# Patient Record
Sex: FEMALE | Race: WHITE | HISPANIC OR LATINO | Employment: PART TIME | URBAN - METROPOLITAN AREA
[De-identification: names, ages, dates, MRNs, and addresses within clinical notes are randomized per-mention and may not be internally consistent; named-entity substitution may affect disease eponyms.]

---

## 2023-10-16 ENCOUNTER — APPOINTMENT (OUTPATIENT)
Dept: GENERAL RADIOLOGY | Facility: HOSPITAL | Age: 33
End: 2023-10-16
Payer: OTHER GOVERNMENT

## 2023-10-16 ENCOUNTER — HOSPITAL ENCOUNTER (EMERGENCY)
Facility: HOSPITAL | Age: 33
Discharge: HOME | End: 2023-10-16
Attending: EMERGENCY MEDICINE | Admitting: EMERGENCY MEDICINE
Payer: OTHER GOVERNMENT

## 2023-10-16 VITALS
TEMPERATURE: 98.4 F | SYSTOLIC BLOOD PRESSURE: 112 MMHG | WEIGHT: 172.4 LBS | BODY MASS INDEX: 31.73 KG/M2 | DIASTOLIC BLOOD PRESSURE: 69 MMHG | HEART RATE: 72 BPM | HEIGHT: 62 IN | OXYGEN SATURATION: 99 % | RESPIRATION RATE: 15 BRPM

## 2023-10-16 DIAGNOSIS — R50.9 FEVER, UNSPECIFIED FEVER CAUSE: Primary | ICD-10-CM

## 2023-10-16 LAB
ALBUMIN SERPL-MCNC: 4.3 G/DL (ref 3.5–5.2)
ALBUMIN/GLOB SERPL: 1.3 G/DL
ALP SERPL-CCNC: 76 U/L (ref 39–117)
ALT SERPL W P-5'-P-CCNC: 16 U/L (ref 1–33)
ANION GAP SERPL CALCULATED.3IONS-SCNC: 9 MMOL/L (ref 5–15)
AST SERPL-CCNC: 18 U/L (ref 1–32)
BACTERIA UR QL AUTO: ABNORMAL /HPF
BASOPHILS # BLD AUTO: 0.1 10*3/MM3 (ref 0–0.2)
BASOPHILS NFR BLD AUTO: 0.9 % (ref 0–1.5)
BILIRUB SERPL-MCNC: 0.2 MG/DL (ref 0–1.2)
BILIRUB UR QL STRIP: NEGATIVE
BUN SERPL-MCNC: 9 MG/DL (ref 6–20)
BUN/CREAT SERPL: 14.1 (ref 7–25)
CALCIUM SPEC-SCNC: 9.3 MG/DL (ref 8.6–10.5)
CHLORIDE SERPL-SCNC: 101 MMOL/L (ref 98–107)
CK SERPL-CCNC: 93 U/L (ref 20–180)
CLARITY UR: CLEAR
CO2 SERPL-SCNC: 28 MMOL/L (ref 22–29)
COLOR UR: YELLOW
CREAT SERPL-MCNC: 0.64 MG/DL (ref 0.57–1)
D-LACTATE SERPL-SCNC: 0.6 MMOL/L (ref 0.3–2)
DEPRECATED RDW RBC AUTO: 41.1 FL (ref 37–54)
EGFRCR SERPLBLD CKD-EPI 2021: 121.3 ML/MIN/1.73
EOSINOPHIL # BLD AUTO: 0.1 10*3/MM3 (ref 0–0.4)
EOSINOPHIL NFR BLD AUTO: 1.3 % (ref 0.3–6.2)
ERYTHROCYTE [DISTWIDTH] IN BLOOD BY AUTOMATED COUNT: 12.5 % (ref 12.3–15.4)
ERYTHROCYTE [SEDIMENTATION RATE] IN BLOOD: 35 MM/HR (ref 0–20)
GLOBULIN UR ELPH-MCNC: 3.3 GM/DL
GLUCOSE SERPL-MCNC: 90 MG/DL (ref 65–99)
GLUCOSE UR STRIP-MCNC: NEGATIVE MG/DL
HCT VFR BLD AUTO: 39.1 % (ref 34–46.6)
HGB BLD-MCNC: 13 G/DL (ref 12–15.9)
HGB UR QL STRIP.AUTO: ABNORMAL
HYALINE CASTS UR QL AUTO: ABNORMAL /LPF
KETONES UR QL STRIP: NEGATIVE
LEUKOCYTE ESTERASE UR QL STRIP.AUTO: ABNORMAL
LYMPHOCYTES # BLD AUTO: 3.3 10*3/MM3 (ref 0.7–3.1)
LYMPHOCYTES NFR BLD AUTO: 31.6 % (ref 19.6–45.3)
MCH RBC QN AUTO: 29.7 PG (ref 26.6–33)
MCHC RBC AUTO-ENTMCNC: 33.3 G/DL (ref 31.5–35.7)
MCV RBC AUTO: 89.4 FL (ref 79–97)
MONOCYTES # BLD AUTO: 0.9 10*3/MM3 (ref 0.1–0.9)
MONOCYTES NFR BLD AUTO: 8.9 % (ref 5–12)
NEUTROPHILS NFR BLD AUTO: 57.3 % (ref 42.7–76)
NEUTROPHILS NFR BLD AUTO: 6.1 10*3/MM3 (ref 1.7–7)
NITRITE UR QL STRIP: NEGATIVE
NRBC BLD AUTO-RTO: 0.1 /100 WBC (ref 0–0.2)
PH UR STRIP.AUTO: 7 [PH] (ref 5–8)
PLATELET # BLD AUTO: 271 10*3/MM3 (ref 140–450)
PMV BLD AUTO: 8.2 FL (ref 6–12)
POTASSIUM SERPL-SCNC: 3.7 MMOL/L (ref 3.5–5.2)
PROT SERPL-MCNC: 7.6 G/DL (ref 6–8.5)
PROT UR QL STRIP: NEGATIVE
RBC # BLD AUTO: 4.37 10*6/MM3 (ref 3.77–5.28)
RBC # UR STRIP: ABNORMAL /HPF
REF LAB TEST METHOD: ABNORMAL
SARS-COV-2 RNA RESP QL NAA+PROBE: NOT DETECTED
SODIUM SERPL-SCNC: 138 MMOL/L (ref 136–145)
SP GR UR STRIP: 1.01 (ref 1–1.03)
SQUAMOUS #/AREA URNS HPF: ABNORMAL /HPF
TSH SERPL DL<=0.05 MIU/L-ACNC: 1.8 UIU/ML (ref 0.27–4.2)
UROBILINOGEN UR QL STRIP: ABNORMAL
WBC # UR STRIP: ABNORMAL /HPF
WBC NRBC COR # BLD: 10.6 10*3/MM3 (ref 3.4–10.8)

## 2023-10-16 PROCEDURE — 81001 URINALYSIS AUTO W/SCOPE: CPT | Performed by: EMERGENCY MEDICINE

## 2023-10-16 PROCEDURE — 86038 ANTINUCLEAR ANTIBODIES: CPT | Performed by: EMERGENCY MEDICINE

## 2023-10-16 PROCEDURE — 86235 NUCLEAR ANTIGEN ANTIBODY: CPT | Performed by: EMERGENCY MEDICINE

## 2023-10-16 PROCEDURE — 86618 LYME DISEASE ANTIBODY: CPT | Performed by: EMERGENCY MEDICINE

## 2023-10-16 PROCEDURE — 36415 COLL VENOUS BLD VENIPUNCTURE: CPT

## 2023-10-16 PROCEDURE — 87798 DETECT AGENT NOS DNA AMP: CPT | Performed by: EMERGENCY MEDICINE

## 2023-10-16 PROCEDURE — 80053 COMPREHEN METABOLIC PANEL: CPT | Performed by: EMERGENCY MEDICINE

## 2023-10-16 PROCEDURE — 86431 RHEUMATOID FACTOR QUANT: CPT | Performed by: EMERGENCY MEDICINE

## 2023-10-16 PROCEDURE — 86225 DNA ANTIBODY NATIVE: CPT | Performed by: EMERGENCY MEDICINE

## 2023-10-16 PROCEDURE — 87484 EHRLICHA CHAFFEENSIS AMP PRB: CPT | Performed by: EMERGENCY MEDICINE

## 2023-10-16 PROCEDURE — 99283 EMERGENCY DEPT VISIT LOW MDM: CPT

## 2023-10-16 PROCEDURE — 84443 ASSAY THYROID STIM HORMONE: CPT | Performed by: EMERGENCY MEDICINE

## 2023-10-16 PROCEDURE — 82550 ASSAY OF CK (CPK): CPT | Performed by: EMERGENCY MEDICINE

## 2023-10-16 PROCEDURE — 83516 IMMUNOASSAY NONANTIBODY: CPT | Performed by: EMERGENCY MEDICINE

## 2023-10-16 PROCEDURE — 85025 COMPLETE CBC W/AUTO DIFF WBC: CPT | Performed by: EMERGENCY MEDICINE

## 2023-10-16 PROCEDURE — 87635 SARS-COV-2 COVID-19 AMP PRB: CPT | Performed by: EMERGENCY MEDICINE

## 2023-10-16 PROCEDURE — 83605 ASSAY OF LACTIC ACID: CPT

## 2023-10-16 PROCEDURE — 85652 RBC SED RATE AUTOMATED: CPT | Performed by: EMERGENCY MEDICINE

## 2023-10-16 PROCEDURE — 71046 X-RAY EXAM CHEST 2 VIEWS: CPT

## 2023-10-16 PROCEDURE — 87040 BLOOD CULTURE FOR BACTERIA: CPT | Performed by: EMERGENCY MEDICINE

## 2023-10-16 PROCEDURE — 87468 ANAPLSMA PHGCYTOPHLM AMP PRB: CPT | Performed by: EMERGENCY MEDICINE

## 2023-10-17 LAB — CHROMATIN AB SERPL-ACNC: <10 IU/ML (ref 0–14)

## 2023-10-17 NOTE — DISCHARGE INSTRUCTIONS
Return for increasing fever vomiting red hot swollen joints rashes shortness of breath or any other new or worse problems or concerns return me to the ER.  Follow-up as directed above the follow-up cultures and tick panel as well as BHUMIKA's and rheumatoid factors and cultures.

## 2023-10-17 NOTE — ED PROVIDER NOTES
Subjective   History of Present Illness  Chief complaint sent here by the urgent care for recurrent fevers    History of present illness 33-year-old female who complains of a 1 year history of recurrent fevers that will last for few days then go away.  She states the last one was a couple days ago and she still has some residual joint aches and pains in her hands.  No cough congestion she has not had fever and chills no vomiting and diarrhea she denies any rashes no urinary complaints no night sweats.  Denies any weight loss.  Denies any leg pain or swelling no recent tick bites or rashes she did have a history of [place] spotted fever in the past.  She denies any discharge or worry of STD no abdominal pain she is otherwise able to eat drink talk walk and function normally.  Nothing really seems to trigger make it better or worse she really has not had any significant evaluation outside of the emergency room.  She went to an urgent care today and they told her she needed to go to the hospital for further evaluation.  She has no fever today the fever had stopped as of yesterday.  No other ill exposures or foreign travels no recent antibiotic use.  Denies any IV drug use.  No recent procedures or flus viruses or vaccinations.      Review of Systems   Constitutional:  Positive for chills and fever.   Eyes:  Negative for photophobia and visual disturbance.   Respiratory:  Negative for cough, chest tightness and shortness of breath.    Cardiovascular:  Negative for chest pain and palpitations.   Gastrointestinal:  Negative for abdominal pain, blood in stool and vomiting.   Genitourinary:  Negative for difficulty urinating, dysuria and vaginal discharge.   Musculoskeletal:  Positive for arthralgias, myalgias and neck pain.   Skin:  Negative for rash and wound.   Neurological:  Positive for weakness. Negative for dizziness, facial asymmetry and speech difficulty.   Psychiatric/Behavioral:  Negative for confusion.         No past medical history on file.  Past medical history as he has had some [place] spotted fever in the past.  No Known Allergies    No past surgical history on file.    No family history on file.    Social History     Socioeconomic History    Marital status:      Social history no alcohol or drug use.  Prior to Admission medications    Not on File        Objective   Physical Exam  Constitutional this is a 33-year-old female awake alert no acute distress triage vital signs reviewed.  HEENT extraocular muscles are intact pupils equal round react there is no photophobia mouth clear neck supple no adenopathy no meningeal signs.  TMs are clear lungs clear no retraction back no CVA tenderness most cervical thoracic lumbar spine tenderness no redness to the back no signs of infection.  Heart regular without murmur.  Lungs clear no retraction no use of accessories abdomen soft without tenderness good bowel sounds no peritoneal findings no enlarged liver or spleen.  No nodes felt anywhere.  No axillary nodes no supraclavicular nodes no inguinal nodes.  Extremities pulses equal upper and lower extremities no edema cords or Homans' sign no evidence of DVT skin warm dry without rashes or cellulitic changes nothing on the bottom of the feet nothing on the bottom of the hands.  Neurologic awake alert and orientated x4 no Paci symmetry speech normal no focal weakness there is no meningeal signs she walks without difficulty is nontoxic-appearing.  Procedures           ED Course      Results for orders placed or performed during the hospital encounter of 10/16/23   COVID-19,CEPHEID/[place],COR/JANNA/PAD/CASSIE/LAG/MAD IN-HOUSE(OR EMERGENT/ADD-ON),NP SWAB IN TRANSPORT MEDIA 3-4 HR TAT, RT-PCR - Swab, Nasopharynx    Specimen: Nasopharynx; Swab   Result Value Ref Range    COVID19 Not Detected Not Detected - Ref. Range   Comprehensive Metabolic Panel    Specimen: Blood   Result Value Ref Range    Glucose 90 65 - 99 mg/dL    BUN  9 6 - 20 mg/dL    Creatinine 0.64 0.57 - 1.00 mg/dL    Sodium 138 136 - 145 mmol/L    Potassium 3.7 3.5 - 5.2 mmol/L    Chloride 101 98 - 107 mmol/L    CO2 28.0 22.0 - 29.0 mmol/L    Calcium 9.3 8.6 - 10.5 mg/dL    Total Protein 7.6 6.0 - 8.5 g/dL    Albumin 4.3 3.5 - 5.2 g/dL    ALT (SGPT) 16 1 - 33 U/L    AST (SGOT) 18 1 - 32 U/L    Alkaline Phosphatase 76 39 - 117 U/L    Total Bilirubin 0.2 0.0 - 1.2 mg/dL    Globulin 3.3 gm/dL    A/G Ratio 1.3 g/dL    BUN/Creatinine Ratio 14.1 7.0 - 25.0    Anion Gap 9.0 5.0 - 15.0 mmol/L    eGFR 121.3 >60.0 mL/min/1.73   Urinalysis With Microscopic If Indicated (No Culture) - Urine, Clean Catch    Specimen: Urine, Clean Catch   Result Value Ref Range    Color, UA Yellow Yellow, Straw    Appearance, UA Clear Clear    pH, UA 7.0 5.0 - 8.0    Specific Gravity, UA 1.014 1.005 - 1.030    Glucose, UA Negative Negative    Ketones, UA Negative Negative    Bilirubin, UA Negative Negative    Blood, UA Trace (A) Negative    Protein, UA Negative Negative    Leuk Esterase, UA Trace (A) Negative    Nitrite, UA Negative Negative    Urobilinogen, UA 1.0 E.U./dL 0.2 - 1.0 E.U./dL   Sedimentation Rate    Specimen: Blood   Result Value Ref Range    Sed Rate 35 (H) 0 - 20 mm/hr   CK    Specimen: Blood   Result Value Ref Range    Creatine Kinase 93 20 - 180 U/L   TSH    Specimen: Blood   Result Value Ref Range    TSH 1.800 0.270 - 4.200 uIU/mL   CBC Auto Differential    Specimen: Blood   Result Value Ref Range    WBC 10.60 3.40 - 10.80 10*3/mm3    RBC 4.37 3.77 - 5.28 10*6/mm3    Hemoglobin 13.0 12.0 - 15.9 g/dL    Hematocrit 39.1 34.0 - 46.6 %    MCV 89.4 79.0 - 97.0 fL    MCH 29.7 26.6 - 33.0 pg    MCHC 33.3 31.5 - 35.7 g/dL    RDW 12.5 12.3 - 15.4 %    RDW-SD 41.1 37.0 - 54.0 fl    MPV 8.2 6.0 - 12.0 fL    Platelets 271 140 - 450 10*3/mm3    Neutrophil % 57.3 42.7 - 76.0 %    Lymphocyte % 31.6 19.6 - 45.3 %    Monocyte % 8.9 5.0 - 12.0 %    Eosinophil % 1.3 0.3 - 6.2 %    Basophil % 0.9 0.0 -  1.5 %    Neutrophils, Absolute 6.10 1.70 - 7.00 10*3/mm3    Lymphocytes, Absolute 3.30 (H) 0.70 - 3.10 10*3/mm3    Monocytes, Absolute 0.90 0.10 - 0.90 10*3/mm3    Eosinophils, Absolute 0.10 0.00 - 0.40 10*3/mm3    Basophils, Absolute 0.10 0.00 - 0.20 10*3/mm3    nRBC 0.1 0.0 - 0.2 /100 WBC   Urinalysis, Microscopic Only - Urine, Clean Catch    Specimen: Urine, Clean Catch   Result Value Ref Range    RBC, UA 0-2 None Seen, 0-2 /HPF    WBC, UA 3-5 (A) None Seen, 0-2 /HPF    Bacteria, UA None Seen None Seen /HPF    Squamous Epithelial Cells, UA 0-2 None Seen, 0-2 /HPF    Hyaline Casts, UA None Seen None Seen /LPF    Methodology Automated Microscopy    POC Lactate    Specimen: Blood   Result Value Ref Range    Lactate 0.6 0.3 - 2.0 mmol/L     XR Chest 2 View    Result Date: 10/16/2023  No acute cardiopulmonary abnormality. Electronically Signed: Brooks Martin MD  10/16/2023 10:43 PM EDT  Workstation ID: [id number]   Medications - No data to display                                         Medical Decision Making  Medical decision making.  The patient had the above exam evaluation.  Patient had chest x-ray my independent review no pneumonia pneumothorax or acute findings.  Labs obtained my independent review COVID-19 negative comprehensive metabolic profile liver enzymes all unremarkable urine was negative.  Sed rate 35 slightly elevated CK normal TSH normal CBC normal lactate within normal limits.  The patient had cultures pending tick panel was sent for the patient including Lyme disease and that was pending BHUMIKA and rheumatoid factors all sent.  And these are all pending at this time.  The patient gives an recurrent history of recurrent fevers over the last year.  Etiology is not quite clear at this point but she is nontoxic.  I do not see evidence of meningitis or encephalitis I do not see evidence of any cellulitic changes anywhere septic joint no evidence of pneumonia or urinary tract infection or pyelonephritis no  evidence that suggest a stroke no evidence to suggest any acute intra-abdominal process.  Although not a complete list of all possibilities.  We talked about the multiple possibilities including infectious inflammatory unknown origin hematologic oncologic.  Stressed the importance of follow-up and she voiced understanding and she was stable and discharged home for outpatient management and follow-up.  Repeat exam resting company no distress well-appearing otherwise.  No rashes anywhere no petechiae no purpura.    Problems Addressed:  Fever, unspecified fever cause: complicated acute illness or injury    Amount and/or Complexity of Data Reviewed  Labs: ordered. Decision-making details documented in ED Course.  Radiology: ordered and independent interpretation performed. Decision-making details documented in ED Course.        Final diagnoses:   Fever, unspecified fever cause       ED Disposition  ED Disposition       ED Disposition   Discharge    Condition   Stable    Comment   --               PATIENT CONNECTION - [facility] [address]  [phone]  In 1 day      Anupama Lua NP  [address] [facility] [address]  [phone]    In 1 day      Abdullahi Sams MD  [id number] [facility]   [facility] [address] [phone]    In 1 day           Medication List      No changes were made to your prescriptions during this visit.            Jj Beckwith MD  10/17/23 0215

## 2023-10-18 LAB
ANA SER QL: POSITIVE
B BURGDOR IGG SER QL: NEGATIVE

## 2023-10-19 LAB
CENTROMERE B AB SER-ACNC: <0.2 AI (ref 0–0.9)
CHROMATIN AB SERPL-ACNC: <0.2 AI (ref 0–0.9)
DSDNA AB SER-ACNC: 4 IU/ML (ref 0–9)
ENA JO1 AB SER-ACNC: <0.2 AI (ref 0–0.9)
ENA RNP AB SER-ACNC: 0.4 AI (ref 0–0.9)
ENA SCL70 AB SER-ACNC: <0.2 AI (ref 0–0.9)
ENA SM AB SER-ACNC: <0.2 AI (ref 0–0.9)
ENA SM+RNP AB SER-ACNC: <0.2 AI (ref 0–0.9)
ENA SS-A AB SER-ACNC: <0.2 AI (ref 0–0.9)
ENA SS-B AB SER-ACNC: <0.2 AI (ref 0–0.9)
Lab: NORMAL
RIBOSOMAL P AB SER-ACNC: <0.2 AI (ref 0–0.9)

## 2023-10-21 LAB
A PHAGOCYTOPH DNA BLD QL NAA+PROBE: NEGATIVE
BACTERIA SPEC AEROBE CULT: NORMAL
BACTERIA SPEC AEROBE CULT: NORMAL
E CHAFFEENSIS DNA BLD QL NAA+PROBE: NEGATIVE

## 2023-10-23 ENCOUNTER — OFFICE VISIT (OUTPATIENT)
Dept: FAMILY MEDICINE CLINIC | Facility: CLINIC | Age: 33
End: 2023-10-23
Payer: OTHER GOVERNMENT

## 2023-10-23 ENCOUNTER — TELEPHONE (OUTPATIENT)
Dept: FAMILY MEDICINE CLINIC | Facility: CLINIC | Age: 33
End: 2023-10-23

## 2023-10-23 VITALS
BODY MASS INDEX: 31.03 KG/M2 | HEIGHT: 62 IN | HEART RATE: 84 BPM | SYSTOLIC BLOOD PRESSURE: 114 MMHG | OXYGEN SATURATION: 99 % | DIASTOLIC BLOOD PRESSURE: 72 MMHG | RESPIRATION RATE: 16 BRPM | WEIGHT: 168.6 LBS

## 2023-10-23 DIAGNOSIS — R76.8 POSITIVE ANA (ANTINUCLEAR ANTIBODY): ICD-10-CM

## 2023-10-23 DIAGNOSIS — M25.50 ARTHRALGIA, UNSPECIFIED JOINT: Primary | ICD-10-CM

## 2023-10-23 DIAGNOSIS — G43.519 INTRACTABLE PERSISTENT MIGRAINE AURA WITHOUT CEREBRAL INFARCTION AND WITHOUT STATUS MIGRAINOSUS: ICD-10-CM

## 2023-10-23 RX ORDER — CYCLOBENZAPRINE HCL 5 MG
5 TABLET ORAL 3 TIMES DAILY PRN
Qty: 30 TABLET | Refills: 1 | Status: SHIPPED | OUTPATIENT
Start: 2023-10-23

## 2023-10-23 RX ORDER — CELECOXIB 200 MG/1
200 CAPSULE ORAL 2 TIMES DAILY PRN
Qty: 60 CAPSULE | Refills: 1 | Status: SHIPPED | OUTPATIENT
Start: 2023-10-23

## 2023-10-23 RX ORDER — TOPIRAMATE 25 MG/1
25 TABLET ORAL DAILY
Qty: 30 TABLET | Refills: 0 | Status: SHIPPED | OUTPATIENT
Start: 2023-10-23 | End: 2023-10-24

## 2023-10-23 NOTE — TELEPHONE ENCOUNTER
"PATIENT CALLED TO SEE IF DR. XIONG HAD AN EARLIER AVAILABLE APPOINTMENT.    PATIENT STATES THAT SHE HAS HAD A SUDDEN ONSET OF SYMPTOMS:    JOINT AND MUSCLE PAIN TO THE POINT THAT SHE CANNOT AMBULATE PROPERLY - HAVING DIFFICULTY USING HER HANDS, WHICH IS HAVING A SEVERE EFFECT ON HER DAY TO DAY LIFE.    HEADACHE / NECK STIFFNESS    INCONSISTENT FEVER    LEGS FEEL LIKE \"RUBBER BANDS\" AT THE TIME OF CALL, NOTING THAT THIS FEELING USUALLY INDICATES THAT SHE WILL NOT BE ABLE TO WALK DUE TO PAIN LATER IN THE DAY.    PATIENT WAS SEEN AT A [facility] EMERGENCY ROOM ON 10/16/23 AND IS VERY CONCERNED ABOUT SOME OF THE LAB TEST RESULTS THAT HAVE CAME BACK ABNORMAL.    PLEASE ADVISE  [phone]   " Pt educated on discharge. Instructions given. PIV removed.  called to set up uber ride home. Pt wheeled to ed waiting room exit.

## 2023-10-23 NOTE — PROGRESS NOTES
"Chief Complaint  Establish Care    Subjective        Kayleigh Weaver presents to [facility]  History of Present Illness  Est care  Recurrent fevers, joint and muscle pain, fatigue  Kayleigh is a 33-year-old female with no significant past medical history who presents today due to episodes of fever, joint pain and at their abnormal changes since January.  She said that beginning in January she had lost ambulation, and all joints and muscles, she had some disorientation and it went away about in a week or 2.  She said she felt inflamed with physical activity and broke out with a rash with sunburn.  She had this fever up to 102 degrees every 3 weeks.  She usually develops a rash with this.  He has no images of this.  She has mentioned that she last week developed worsening pain in her legs with increased rubber band like feeling in her legs a lot associated with fever and other arthralgias.  She went to the ER and they did lab test on her and had increased inflammation of ESR, positive BHUMIKA negative reflex.  She said she is very fatigued as well.  She has history of brucelosis.     Migraines  She also gets migraines, states that happen after every day.  Has been on Excedrin Migraine for this in the past.  She gets an aura with this and changes in vision.  She has a going to a dark room.        Objective   Vital Signs:  /72 (BP Location: Left arm, Patient Position: Sitting, Cuff Size: Adult)   Pulse 84   Resp 16   Ht 157.5 cm (62\")   Wt 76.5 kg (168 lb 9.6 oz)   SpO2 99%   BMI 30.84 kg/m²   Estimated body mass index is 30.84 kg/m² as calculated from the following:    Height as of this encounter: 157.5 cm (62\").    Weight as of this encounter: 76.5 kg (168 lb 9.6 oz).       BMI is >= 30 and <35. (Class 1 Obesity). The following options were offered after discussion;: exercise counseling/recommendations and nutrition counseling/recommendations      Physical " Exam  Constitutional:       General: She is not in acute distress.     Appearance: Normal appearance. She is normal weight.   HENT:      Head: Normocephalic.      Right Ear: Tympanic membrane and ear canal normal.      Left Ear: Tympanic membrane and ear canal normal.      Nose: Nose normal. No congestion.      Mouth/Throat:      Mouth: Mucous membranes are moist.      Pharynx: No oropharyngeal exudate.   Eyes:      General:         Right eye: No discharge.         Left eye: No discharge.      Extraocular Movements: Extraocular movements intact.      Pupils: Pupils are equal, round, and reactive to light.   Cardiovascular:      Rate and Rhythm: Normal rate and regular rhythm.      Pulses: Normal pulses.      Heart sounds: Normal heart sounds.   Pulmonary:      Effort: Pulmonary effort is normal. No respiratory distress.      Breath sounds: Normal breath sounds. No wheezing.   Abdominal:      General: Abdomen is flat. Bowel sounds are normal. There is no distension.      Palpations: Abdomen is soft.      Tenderness: There is no abdominal tenderness.   Musculoskeletal:         General: Tenderness (Tenderness to palpation throughout the entire back and upper arms.) present. No swelling or deformity. Normal range of motion.      Cervical back: Normal range of motion. No rigidity.   Skin:     General: Skin is warm.      Capillary Refill: Capillary refill takes less than 2 seconds.      Coloration: Skin is not jaundiced.      Findings: No bruising.   Neurological:      General: No focal deficit present.      Mental Status: She is alert and oriented to person, place, and time. Mental status is at baseline.      Cranial Nerves: No cranial nerve deficit.      Coordination: Coordination normal.      Deep Tendon Reflexes: Reflexes normal.   Psychiatric:         Mood and Affect: Mood normal.         Behavior: Behavior normal.        Result Review :  The following data was reviewed by: Lambert Melara MD on 10/23/2023:  Common  labs          10/16/2023    22:12   Common Labs   Glucose 90    BUN 9    Creatinine 0.64    Sodium 138    Potassium 3.7    Chloride 101    Calcium 9.3    Albumin 4.3    Total Bilirubin 0.2    Alkaline Phosphatase 76    AST (SGOT) 18    ALT (SGPT) 16    WBC 10.60    Hemoglobin 13.0    Hematocrit 39.1    Platelets 271      Data reviewed : ER notes             Assessment and Plan   Diagnoses and all orders for this visit:    1. Arthralgia, unspecified joint (Primary)  -     Ambulatory Referral to Rheumatology    2. Positive BHUMIKA (antinuclear antibody)  -     Ambulatory Referral to Rheumatology    Other orders  -     cyclobenzaprine (FLEXERIL) 5 MG tablet; Take 1 tablet by mouth 3 (Three) Times a Day As Needed for Muscle Spasms. Can take 1-2 tablets by mouth three times a day  Dispense: 30 tablet; Refill: 1  -     celecoxib (CeleBREX) 200 MG capsule; Take 1 capsule by mouth 2 (Two) Times a Day As Needed for Mild Pain.  Dispense: 60 capsule; Refill: 1  -     Discontinue: topiramate (Topamax) 25 MG tablet; Take 1 tablet by mouth Daily.  Dispense: 30 tablet; Refill: 0    Kayleigh is a 33-year-old female with no significant past medical history who presents today status post an ER visit where she was noted to have an BHUMIKA positive and ESR elevation.  She does seem very tender to palpation over her entire spine and arms.  No rash notified on exam or other concerning findings on exam.  He does have some blood in urine from previous urine studies, I mentioned possibly getting HIV studies to rule out another cause for these issues, she said she would wait on that.  We discussed further rheumatologic work-up, but will defer to rheumatology at this time.  I believe that this inflammation that she is having could be a rheumatologic issue, but with how tender she is throughout her upper extremities and lower extremities, this does have a feeling of fibromyalgia as well though would not explain the ESR elevation.    We will treat her  for the pain with Celebrex as needed for pain along with Flexeril for muscle pain if that does not improve her pain, can consider duloxetine.      For her migraines, will prescribe Topamax for daily migraines.  If that does not help her, we can consider other new medications         Follow Up   Return in about 6 months (around 4/23/2024) for Annual physical.  Patient was given instructions and counseling regarding her condition or for health maintenance advice. Please see specific information pulled into the AVS if appropriate.

## 2023-10-24 PROBLEM — M25.50 ARTHRALGIA: Status: ACTIVE | Noted: 2023-10-24

## 2023-10-24 PROBLEM — G43.519 INTRACTABLE PERSISTENT MIGRAINE AURA WITHOUT CEREBRAL INFARCTION AND WITHOUT STATUS MIGRAINOSUS: Status: ACTIVE | Noted: 2023-10-24

## 2023-10-24 PROBLEM — R76.8 POSITIVE ANA (ANTINUCLEAR ANTIBODY): Status: ACTIVE | Noted: 2023-10-24

## 2023-10-24 LAB — RICKETTSIA RICKETTSII DNA, RT: NOT DETECTED

## 2023-10-24 RX ORDER — TOPIRAMATE 25 MG/1
25 TABLET ORAL DAILY
Qty: 90 TABLET | Refills: 0 | Status: SHIPPED | OUTPATIENT
Start: 2023-10-24

## 2023-11-17 ENCOUNTER — TELEPHONE (OUTPATIENT)
Dept: FAMILY MEDICINE CLINIC | Facility: CLINIC | Age: 33
End: 2023-11-17
Payer: OTHER GOVERNMENT

## 2023-11-17 NOTE — TELEPHONE ENCOUNTER
Caller: Kayleigh Weaver    Relationship to patient: Self    Best call back number: [phone]    Type of visit: EKG    Requested date:  ANY DAY BUT TUESDAY'S     Additional notes: PATIENT STATES  SHE WAS MESSAGING DR. XIONG AND WAS ADVISED TO CALL AND GET SCHEDULED FOR AN EKG.    PATIENT IS REQUESTING A CALL BACK TO GET SCHEDULED.

## 2023-11-28 ENCOUNTER — HOSPITAL ENCOUNTER (EMERGENCY)
Facility: HOSPITAL | Age: 33
Discharge: HOME | End: 2023-11-28
Attending: EMERGENCY MEDICINE | Admitting: EMERGENCY MEDICINE
Payer: OTHER GOVERNMENT

## 2023-11-28 ENCOUNTER — APPOINTMENT (OUTPATIENT)
Dept: RESPIRATORY THERAPY | Facility: HOSPITAL | Age: 33
End: 2023-11-28
Payer: OTHER GOVERNMENT

## 2023-11-28 ENCOUNTER — APPOINTMENT (OUTPATIENT)
Dept: GENERAL RADIOLOGY | Facility: HOSPITAL | Age: 33
End: 2023-11-28
Payer: OTHER GOVERNMENT

## 2023-11-28 VITALS
OXYGEN SATURATION: 97 % | BODY MASS INDEX: 31.04 KG/M2 | TEMPERATURE: 98.1 F | RESPIRATION RATE: 24 BRPM | WEIGHT: 168.65 LBS | DIASTOLIC BLOOD PRESSURE: 76 MMHG | HEART RATE: 85 BPM | SYSTOLIC BLOOD PRESSURE: 114 MMHG | HEIGHT: 62 IN

## 2023-11-28 DIAGNOSIS — R07.9 CHEST PAIN, UNSPECIFIED TYPE: ICD-10-CM

## 2023-11-28 DIAGNOSIS — R55 NEAR SYNCOPE: ICD-10-CM

## 2023-11-28 DIAGNOSIS — R00.2 PALPITATIONS: Primary | ICD-10-CM

## 2023-11-28 LAB
ALBUMIN SERPL-MCNC: 4.3 G/DL (ref 3.5–5.2)
ALBUMIN/GLOB SERPL: 1.2 G/DL
ALP SERPL-CCNC: 68 U/L (ref 39–117)
ALT SERPL W P-5'-P-CCNC: 15 U/L (ref 1–33)
AMPHET+METHAMPHET UR QL: NEGATIVE
ANION GAP SERPL CALCULATED.3IONS-SCNC: 12 MMOL/L (ref 5–15)
APTT PPP: 27.6 SECONDS (ref 61–76.5)
AST SERPL-CCNC: 17 U/L (ref 1–32)
B-HCG UR QL: NEGATIVE
BACTERIA UR QL AUTO: ABNORMAL /HPF
BARBITURATES UR QL SCN: NEGATIVE
BASOPHILS # BLD AUTO: 0.1 10*3/MM3 (ref 0–0.2)
BASOPHILS NFR BLD AUTO: 0.5 % (ref 0–1.5)
BENZODIAZ UR QL SCN: NEGATIVE
BILIRUB SERPL-MCNC: 0.3 MG/DL (ref 0–1.2)
BILIRUB UR QL STRIP: NEGATIVE
BUN SERPL-MCNC: 10 MG/DL (ref 6–20)
BUN/CREAT SERPL: 15.6 (ref 7–25)
CALCIUM SPEC-SCNC: 9.8 MG/DL (ref 8.6–10.5)
CANNABINOIDS SERPL QL: NEGATIVE
CHLORIDE SERPL-SCNC: 104 MMOL/L (ref 98–107)
CLARITY UR: ABNORMAL
CO2 SERPL-SCNC: 22 MMOL/L (ref 22–29)
COCAINE UR QL: NEGATIVE
COLOR UR: YELLOW
CREAT SERPL-MCNC: 0.64 MG/DL (ref 0.57–1)
D DIMER PPP FEU-MCNC: 0.47 MG/L (FEU) (ref 0–0.5)
DEPRECATED RDW RBC AUTO: 42.4 FL (ref 37–54)
EGFRCR SERPLBLD CKD-EPI 2021: 121.3 ML/MIN/1.73
EOSINOPHIL # BLD AUTO: 0.1 10*3/MM3 (ref 0–0.4)
EOSINOPHIL NFR BLD AUTO: 0.8 % (ref 0.3–6.2)
ERYTHROCYTE [DISTWIDTH] IN BLOOD BY AUTOMATED COUNT: 13 % (ref 12.3–15.4)
ETHANOL UR QL: <0.01 %
GLOBULIN UR ELPH-MCNC: 3.5 GM/DL
GLUCOSE SERPL-MCNC: 154 MG/DL (ref 65–99)
GLUCOSE UR STRIP-MCNC: NEGATIVE MG/DL
HCT VFR BLD AUTO: 39.1 % (ref 34–46.6)
HGB BLD-MCNC: 13.4 G/DL (ref 12–15.9)
HGB UR QL STRIP.AUTO: ABNORMAL
HYALINE CASTS UR QL AUTO: ABNORMAL /LPF
INR PPP: 0.97 (ref 0.93–1.1)
KETONES UR QL STRIP: ABNORMAL
LEUKOCYTE ESTERASE UR QL STRIP.AUTO: ABNORMAL
LYMPHOCYTES # BLD AUTO: 2.9 10*3/MM3 (ref 0.7–3.1)
LYMPHOCYTES NFR BLD AUTO: 25.8 % (ref 19.6–45.3)
MAGNESIUM SERPL-MCNC: 1.9 MG/DL (ref 1.6–2.6)
MCH RBC QN AUTO: 30.4 PG (ref 26.6–33)
MCHC RBC AUTO-ENTMCNC: 34.2 G/DL (ref 31.5–35.7)
MCV RBC AUTO: 88.8 FL (ref 79–97)
METHADONE UR QL SCN: NEGATIVE
MONOCYTES # BLD AUTO: 0.7 10*3/MM3 (ref 0.1–0.9)
MONOCYTES NFR BLD AUTO: 6.7 % (ref 5–12)
NEUTROPHILS NFR BLD AUTO: 66.2 % (ref 42.7–76)
NEUTROPHILS NFR BLD AUTO: 7.4 10*3/MM3 (ref 1.7–7)
NITRITE UR QL STRIP: NEGATIVE
NRBC BLD AUTO-RTO: 0.1 /100 WBC (ref 0–0.2)
NT-PROBNP SERPL-MCNC: <36 PG/ML (ref 0–450)
OPIATES UR QL: NEGATIVE
OXYCODONE UR QL SCN: NEGATIVE
PH UR STRIP.AUTO: 6 [PH] (ref 5–8)
PLATELET # BLD AUTO: 262 10*3/MM3 (ref 140–450)
PMV BLD AUTO: 8.5 FL (ref 6–12)
POTASSIUM SERPL-SCNC: 4 MMOL/L (ref 3.5–5.2)
PROT SERPL-MCNC: 7.8 G/DL (ref 6–8.5)
PROT UR QL STRIP: NEGATIVE
PROTHROMBIN TIME: 10.6 SECONDS (ref 9.6–11.7)
RBC # BLD AUTO: 4.41 10*6/MM3 (ref 3.77–5.28)
RBC # UR STRIP: ABNORMAL /HPF
REF LAB TEST METHOD: ABNORMAL
SODIUM SERPL-SCNC: 138 MMOL/L (ref 136–145)
SP GR UR STRIP: 1.02 (ref 1–1.03)
SQUAMOUS #/AREA URNS HPF: ABNORMAL /HPF
TROPONIN T SERPL HS-MCNC: <6 NG/L
TSH SERPL DL<=0.05 MIU/L-ACNC: 1.18 UIU/ML (ref 0.27–4.2)
UROBILINOGEN UR QL STRIP: ABNORMAL
WBC # UR STRIP: ABNORMAL /HPF
WBC NRBC COR # BLD AUTO: 11.2 10*3/MM3 (ref 3.4–10.8)

## 2023-11-28 PROCEDURE — 85025 COMPLETE CBC W/AUTO DIFF WBC: CPT | Performed by: NURSE PRACTITIONER

## 2023-11-28 PROCEDURE — 85610 PROTHROMBIN TIME: CPT | Performed by: NURSE PRACTITIONER

## 2023-11-28 PROCEDURE — 80307 DRUG TEST PRSMV CHEM ANLYZR: CPT | Performed by: NURSE PRACTITIONER

## 2023-11-28 PROCEDURE — 96365 THER/PROPH/DIAG IV INF INIT: CPT

## 2023-11-28 PROCEDURE — 83880 ASSAY OF NATRIURETIC PEPTIDE: CPT | Performed by: NURSE PRACTITIONER

## 2023-11-28 PROCEDURE — 85730 THROMBOPLASTIN TIME PARTIAL: CPT | Performed by: NURSE PRACTITIONER

## 2023-11-28 PROCEDURE — 25010000002 CEFTRIAXONE PER 250 MG

## 2023-11-28 PROCEDURE — 93005 ELECTROCARDIOGRAM TRACING: CPT | Performed by: NURSE PRACTITIONER

## 2023-11-28 PROCEDURE — 81001 URINALYSIS AUTO W/SCOPE: CPT | Performed by: NURSE PRACTITIONER

## 2023-11-28 PROCEDURE — 25810000003 SODIUM CHLORIDE 0.9 % SOLUTION

## 2023-11-28 PROCEDURE — 99284 EMERGENCY DEPT VISIT MOD MDM: CPT

## 2023-11-28 PROCEDURE — 80053 COMPREHEN METABOLIC PANEL: CPT | Performed by: NURSE PRACTITIONER

## 2023-11-28 PROCEDURE — 87086 URINE CULTURE/COLONY COUNT: CPT

## 2023-11-28 PROCEDURE — 83735 ASSAY OF MAGNESIUM: CPT | Performed by: NURSE PRACTITIONER

## 2023-11-28 PROCEDURE — 81025 URINE PREGNANCY TEST: CPT | Performed by: NURSE PRACTITIONER

## 2023-11-28 PROCEDURE — 82077 ASSAY SPEC XCP UR&BREATH IA: CPT | Performed by: NURSE PRACTITIONER

## 2023-11-28 PROCEDURE — 84443 ASSAY THYROID STIM HORMONE: CPT | Performed by: NURSE PRACTITIONER

## 2023-11-28 PROCEDURE — 85379 FIBRIN DEGRADATION QUANT: CPT | Performed by: NURSE PRACTITIONER

## 2023-11-28 PROCEDURE — 71045 X-RAY EXAM CHEST 1 VIEW: CPT

## 2023-11-28 PROCEDURE — 93246 EXT ECG>7D<15D RECORDING: CPT

## 2023-11-28 PROCEDURE — 84484 ASSAY OF TROPONIN QUANT: CPT | Performed by: NURSE PRACTITIONER

## 2023-11-28 RX ORDER — SODIUM CHLORIDE 0.9 % (FLUSH) 0.9 %
10 SYRINGE (ML) INJECTION AS NEEDED
Status: DISCONTINUED | OUTPATIENT
Start: 2023-11-28 | End: 2023-11-28 | Stop reason: HOSPADM

## 2023-11-28 RX ORDER — CEFDINIR 300 MG/1
300 CAPSULE ORAL 2 TIMES DAILY
Qty: 10 CAPSULE | Refills: 0 | Status: SHIPPED | OUTPATIENT
Start: 2023-11-28 | End: 2023-12-03

## 2023-11-28 RX ADMIN — CEFTRIAXONE 2000 MG: 2 INJECTION, POWDER, FOR SOLUTION INTRAMUSCULAR; INTRAVENOUS at 16:26

## 2023-11-28 RX ADMIN — SODIUM CHLORIDE 1000 ML: 9 INJECTION, SOLUTION INTRAVENOUS at 16:26

## 2023-11-28 NOTE — ED PROVIDER NOTES
Subjective   Provider in Triage Note  33-year-old female comes in with family with complaints of chest pain that started approximately 15 minutes prior to arrival.  She is noted to be tachypneic and states she has some tingling in her bilateral knees and feet.  She was seen here approximately month ago for a year of unexplained fevers with positive BHUMIKA and referral to rheumatology through her PCP a couple weeks ago.  No history of VTE or OCPs.  Phone note to PCP 2 weeks ago shows patient reported she was having some increased heart rate in the 120s.  No reported edema or fevers.  Denies any alcohol or recreational drug use.  Currently on Topamax for migraines Flexeril and Celebrex    Due to significant overcrowding in the emergency department patient was initially seen and evaluated in triage.  Provider in triage recommended patient placement in the treatment area to initiate therapy and movement to an ER bed as soon as possible.   Orders placed; medications will be deferred to main provider per protocol.        History of Present Illness  I have read the above note read by previous provider in triage and attest that is accurate for patient's HPI.      Review of Systems   Constitutional:  Negative for appetite change and fever.   HENT:  Negative for congestion and rhinorrhea.    Respiratory:  Negative for cough and shortness of breath.    Cardiovascular:  Positive for chest pain.   Gastrointestinal:  Negative for abdominal pain.   Genitourinary:  Negative for dysuria.   Musculoskeletal:  Positive for myalgias.   Neurological:  Positive for dizziness.   All other systems reviewed and are negative.      Past Medical History:   Diagnosis Date    Anxiety     Depression     Jose Manuel Mountain spotted fever        No Known Allergies    Past Surgical History:   Procedure Laterality Date     SECTION      CHOLECYSTECTOMY      GALLBLADDER SURGERY      TUBAL ABDOMINAL LIGATION      WISDOM TOOTH EXTRACTION      WISDOM TOOTH  "EXTRACTION         Family History   Problem Relation Age of Onset    Diabetes Mother     Vision loss Mother     Rheum arthritis Father     Early death Maternal Aunt         Lupus    Lupus Maternal Aunt        Social History     Socioeconomic History    Marital status:    Tobacco Use    Smoking status: Never    Smokeless tobacco: Never   Vaping Use    Vaping Use: Never used   Substance and Sexual Activity    Alcohol use: Not Currently     Alcohol/week: 1.0 standard drink of alcohol     Types: 1 Glasses of wine per week    Drug use: Never    Sexual activity: Yes     Partners: Female, Male     Birth control/protection: Tubal ligation           Objective   Physical Exam  Vitals and nursing note reviewed.   Constitutional:       General: She is not in acute distress.     Appearance: She is well-developed. She is not ill-appearing.   HENT:      Head: Normocephalic and atraumatic.   Cardiovascular:      Rate and Rhythm: Normal rate and regular rhythm.      Heart sounds: Normal heart sounds. No murmur heard.  Pulmonary:      Effort: Pulmonary effort is normal. No tachypnea.      Breath sounds: Normal breath sounds. No decreased breath sounds.   Abdominal:      General: Bowel sounds are normal.      Palpations: Abdomen is soft.   Musculoskeletal:         General: Normal range of motion.   Neurological:      Mental Status: She is alert.         Procedures           ED Course      /76   Pulse 85   Temp 98.1 °F (36.7 °C) (Oral)   Resp 24   Ht 157.5 cm (62\")   Wt 76.5 kg (168 lb 10.4 oz)   LMP  (LMP Unknown)   SpO2 97%   BMI 30.85 kg/m²   Labs Reviewed   COMPREHENSIVE METABOLIC PANEL - Abnormal; Notable for the following components:       Result Value    Glucose 154 (*)     All other components within normal limits    Narrative:     GFR Normal >60  Chronic Kidney Disease <60  Kidney Failure <15     APTT - Abnormal; Notable for the following components:    PTT 27.6 (*)     All other components within normal " limits   CBC WITH AUTO DIFFERENTIAL - Abnormal; Notable for the following components:    WBC 11.20 (*)     Neutrophils, Absolute 7.40 (*)     All other components within normal limits   URINALYSIS W/ MICROSCOPIC IF INDICATED (NO CULTURE) - Abnormal; Notable for the following components:    Appearance, UA Hazy (*)     Ketones, UA Trace (*)     Blood, UA Large (3+) (*)     Leuk Esterase, UA Small (1+) (*)     All other components within normal limits   URINALYSIS, MICROSCOPIC ONLY - Abnormal; Notable for the following components:    RBC, UA 3-5 (*)     WBC, UA 6-10 (*)     Bacteria, UA 1+ (*)     Squamous Epithelial Cells, UA 7-12 (*)     All other components within normal limits   PREGNANCY, URINE - Normal   PROTIME-INR - Normal   BNP (IN-HOUSE) - Normal    Narrative:     This assay is used as an aid in the diagnosis of individuals suspected of having heart failure. It can be used as an aid in the diagnosis of acute decompensated heart failure (ADHF) in patients presenting with signs and symptoms of ADHF to the emergency department (ED). In addition, NT-proBNP of <300 pg/mL indicates ADHF is not likely.    Age Range Result Interpretation  NT-proBNP Concentration (pg/mL:      <50             Positive            >450                   Gray                 300-450                    Negative             <300    50-75           Positive            >900                  Gray                300-900                  Negative            <300      >75             Positive            >1800                  Gray                300-1800                  Negative            <300   D-DIMER, QUANTITATIVE - Normal    Narrative:     According to the assay 's published package insert, a normal (<0.50 mg/L (FEU)) D-dimer result in conjunction with a non-high clinical probability assessment, excludes deep vein thrombosis (DVT) and pulmonary embolism (PE) with high sensitivity.    D-dimer values increase with age and this can  "make VTE exclusion of an older population difficult. To address this, the [employer] College of [employer], based on best available evidence and recent guidelines, recommends that clinicians use age-adjusted D-dimer thresholds in patients greater than 50 years of age with: a) a low probability of PE who do not meet all Pulmonary Embolism Rule Out Criteria, or b) in those with intermediate probability of PE.   The formula for an age-adjusted D-dimer cut-off is \"age/100\".  For example, a 60 year old patient would have an age-adjusted cut-off of 0.60 mg/L (FEU) and an 80 year old 0.80 mg/L (FEU).   TROPONIN - Normal    Narrative:     High Sensitive Troponin T Reference Range:  <14.0 ng/L- Negative Female for AMI  <22.0 ng/L- Negative Male for AMI  >=14 - Abnormal Female indicating possible myocardial injury.  >=22 - Abnormal Male indicating possible myocardial injury.   Clinicians would have to utilize clinical acumen, EKG, Troponin, and serial changes to determine if it is an Acute Myocardial Infarction or myocardial injury due to an underlying chronic condition.        URINE DRUG SCREEN - Normal    Narrative:     Negative Thresholds Per Drugs Screened:    Amphetamines                 500 ng/ml  Barbiturates                 200 ng/ml  Benzodiazepines              100 ng/ml  Cocaine                      300 ng/ml  Methadone                    300 ng/ml  Opiates                      300 ng/ml  Oxycodone                    100 ng/ml  THC                           50 ng/ml    The Normal Value for all drugs tested is negative. This report includes final unconfirmed screening results to be used for medical treatment purposes only. Unconfirmed results must not be used for non-medical purposes such as employment or legal testing. Clinical consideration should be applied to any drug of abuse test, particularly when unconfirmed results are used.          All urine drugs of abuse requests without chain of custody are for medical " screening purposes only.  False positives are possible.     TSH - Normal   MAGNESIUM - Normal   ETHANOL    Narrative:     Plasma Ethanol Clinical Symptoms:    ETOH (%)               Clinical Symptom  .01-.05              No apparent influence  .03-.12              Euphoria, Diminished judgment and attention   .09-.25              Impaired comprehension, Muscle incoordination  .18-.30              Confusion, Staggered gait, Slurred speech  .25-.40              Markedly decreased response to stimuli, unable to stand or                        walk, vomitting, sleep or stupor  .35-.50              Comatose, Anesthesia, Subnormal body temperature       HIGH SENSITIVITIY TROPONIN T 2HR   URINALYSIS W/ CULTURE IF INDICATED   CBC AND DIFFERENTIAL    Narrative:     The following orders were created for panel order CBC & Differential.  Procedure                               Abnormality         Status                     ---------                               -----------         ------                     CBC Auto Differential[[id number]]        Abnormal            Final result                 Please view results for these tests on the individual orders.     Medications   sodium chloride 0.9 % flush 10 mL (has no administration in time range)   sodium chloride 0.9 % bolus 1,000 mL (1,000 mL Intravenous New Bag 11/28/23 1626)   cefTRIAXone (ROCEPHIN) 2,000 mg in sodium chloride 0.9 % 100 mL IVPB (2,000 mg Intravenous New Bag 11/28/23 1626)     XR Chest 1 View    Result Date: 11/28/2023  Impression: No acute process. Electronically Signed: Oliva Daly MD  11/28/2023 2:47 PM EST  Workstation ID: [id number]             Medical Decision Making  Amount and/or Complexity of Data Reviewed  Labs: ordered. Decision-making details documented in ED Course.  Radiology: ordered. Decision-making details documented in ED Course.  ECG/medicine tests: ordered.     Details: My interpretation of EKG reveals sinus rhythm with a regular rate of  96.  No acute ST elevation or ectopy.  No previous EKG available for comparison.  EKG was also read by Dr. Valle, has agreeable to my interpretation.    Risk  Prescription drug management.    Patient is a pleasant 33-year-old  female who is currently being worked up for lupus or MS after having elevated BHUMIKA findings recently presents the emergency room with complaints of acute onset chest pain and palpitations with near syncopal episode that occurred just prior to her arrival to the ER today.  Exam is relatively unremarkable with normal S1/S2.  No clicks or murmurs.  No JVD or leg swelling.  Lungs clear to auscultation in all fields.  She is able to move all extremities with full and active range of motion.  No loss of sensation.  She is alert and answers questions appropriately.  Initial differentials include palpitations, anxiety, dehydration, electrolyte imbalance.  This is not a complete list.    Patient was evaluated by provider in triage, who placed initial orders.  IV was established labs were obtained in triage.  After patient was placed in main ER treatment area, I assumed care and patient received above examination.  Her CBC and CMP are relatively unremarkable with mild leukocytosis of 11.2 and hyperglycemia of 154.  TSH within normal limits.  Dimer is within normal limits.  EtOH is negative.  No substances detected on urine drug screen.  Her urinalysis contains bacteria, white blood cells and leukocytes concerning for acute UTI.  Urine culture was ordered and patient received Rocephin.  My interpretation of chest x-ray reveals no infiltrates, pneumothorax or nodules.  This concurrent with radiologist.  Upon reassessment, patient is feeling improvement since arrival.  Results were discussed with patient and she was offered a Holter monitor for outpatient monitoring of her palpitations.  She is agreeable to wearing the monitor for 14 days and following up with cardiology afterwards.  She was  advised to return to the ER if chest pain or syncope returns and will also receive antibiotics for treatment of acute UTI outpatient.  She has remained hemodynamically stable and is in no acute distress.  Patient was able to ambulate upright steadily with her ambulatory aid at discharge.    I discussed the findings with patient who voices understanding of discharge instructions, signs and symptoms requiring return to the ED; discharged improved and stable condition with follow-up for reevaluation.    Patient is aware that discharge does not mean that nothing is wrong but it indicates no emergency is present and they must continue care with follow-up as given below or physician of their choice.    This document is intended for medical expert use only.  Reading of this document by patients and/or patient's family without participating medical staff guidance may result in misinterpretation and unintended morbidity.  Any interpretation of such data is the responsibility of the patient and/or family member responsible for the patient in concert with their primary or specialist providers, not to be left for sources of online search as such as [employer], [employer] or similar queries.  Relying on these approaches to knowledge may result in misinterpretation, misguided goals of care and even death should patient or family members try recommendations outside of the realm of professional medical care in a supervised inpatient environment.    This medical document was created using Dragon dictation system. Some errors in speech recognition may occur.    Final diagnoses:   Chest pain, unspecified type   Palpitations   Near syncope       ED Disposition  ED Disposition       ED Disposition   Discharge    Condition   Stable    Comment   --               Lambert Melara MD  [address]  [address]  [place] [address] [phone]          Chicho Reyes MD  [address] [place] [address] [place] [address] [phone]                Medication List        New Prescriptions      cefdinir 300 MG capsule  Commonly known as: OMNICEF  Take 1 capsule by mouth 2 (Two) Times a Day for 5 days.               Where to Get Your Medications        These medications were sent to [id number] - [place], [place] - [address] AT [address] OF  &  - [phone]  - [phone] [address] [address] [address], [place] [address]      Phone: [phone]   cefdinir 300 MG capsule            Brittany Munguia, APRN  11/28/23 9762

## 2023-11-28 NOTE — DISCHARGE INSTRUCTIONS
Wear Holter monitor as directed and return promptly when study is complete.  Take antibiotics as directed and be sure to stay entire course.  Rest.  Increase your fluid intake and avoid dehydrating compounds it is caffeine, coffee, tea and soda.    Follow-up to cardiology for further evaluation and management of palpitations.  Follow-up to primary care provider as needed.    Return to the ER for new or worsening symptoms..

## 2023-11-28 NOTE — ED NOTES
Pt presented to the ED with complaints of LOC and chest pain. Pt's  is at bedside. Pt states she did not hit head. Pt states she did not vomit after LOC. Pt's  states she had LOC 2x. Pt's states lab work BHUMIKA + and sediment rate was elevated. Pt states that for the last month she has had difficulty walking. Pt states she gets muscle spasms most of the time on her left side. Pt states she uses ambulatory aid of forearm crutch for her left hand side. Pt states she is prescribed Topamax, Celebrex, and cyclobenzabrine. Pt states she has had a increase in migraine intensity and has progressed into a sharp stabbing pain and she has spots come into her vision that will radiate down to her neck. Pt is alert and oriented x4. Pt's placed in gown. Pt placed on continuous cardiac and oxygen saturation monitoring. Pt is in ER stretcher with call light within reach with instructions to use. Pt is free from needs at this time.

## 2023-11-29 ENCOUNTER — TELEPHONE (OUTPATIENT)
Dept: FAMILY MEDICINE CLINIC | Facility: CLINIC | Age: 33
End: 2023-11-29
Payer: OTHER GOVERNMENT

## 2023-11-29 LAB
BACTERIA SPEC AEROBE CULT: NO GROWTH
QT INTERVAL: 344 MS
QTC INTERVAL: 434 MS

## 2023-11-29 NOTE — TELEPHONE ENCOUNTER
Caller: Kayleigh Weaver    Relationship: Self    Best call back number: [phone]    What is the best time to reach you: ANYTIME    Who are you requesting to speak with (clinical staff, provider,  specific staff member): CLINICAL    What was the call regarding: PATIENT CALLING STATING THAT SHE WENT TO [facility] EMERGENCY ROOM YESTERDAY, 11/28, FOR SEVERE CHEST PAIN, AND PASSED OUT. THE PATIENT STATES THAT AN EKG WAS PERFORMED AND SHE IS CURRENTLY WEARING A 14-DAY HEART MONITOR. THE PATIENT IS CONTACTING CARDIOLOGY TODAY TO TRY TO GET AN APPOINTMENT BEFORE THE 14 DAYS.     Is it okay if the provider responds through MyChart: NO

## 2023-12-01 ENCOUNTER — TELEPHONE (OUTPATIENT)
Dept: FAMILY MEDICINE CLINIC | Facility: CLINIC | Age: 33
End: 2023-12-01
Payer: OTHER GOVERNMENT

## 2023-12-01 NOTE — TELEPHONE ENCOUNTER
Caller: Kayleigh Weaver    Relationship: Self    Best call back number: [phone]     What is the medical concern/diagnosis: PALPITATIONS    What specialty or service is being requested: CARDIOLOGY    What is the provider, practice or medical service name: ADITHYA CAMACHO    What is the office location:     What is the office phone number:    Any additional details: ELENITA IS NEEDING A REFERRAL FROM PRIMARY CARE PROVIDER. PATIENT IS SCHEDULED FOR FOR HOSPITAL FOLLOW UP ON 12/4/23 WITH DR. CAMACHO. PLEASE CALL AND ADVISE.

## 2023-12-02 NOTE — PROGRESS NOTES
Cardiology Consult Note    Patient Identification:  Name: Kayleigh Weaver  Age: 33 y.o.  Sex: female  :  1990  MRN: [id number]             Requesting Physician :  Lambert Melara MD     Reason for Consultation / Chief Complaint :   Palpitations    History of Present Illness:      Ms. Kayleigh Weaver has PMH of    Jose Manuel [facility] spotted fever  Anxiety, depression  , cholecystectomy, tubal ligation    Here for evaluation of palpitations.  Patient was in the emergency room 2023 with complaint of palpitations.  Patient recently has been having issues with arthritis and muscle weakness, left-sided numbness and had BHUMIKA positive and recently was started on Topamax Celebrex and Flexeril for possible lupus and migraines.  2 weeks later patient h has been noticing palpitations where her heart rates go up to 1 17-1 20 last anywhere up to 30 minutes happens every other day or every day for the last 2 weeks.  Last Thursday had severe midsternal chest pain which got progressively worse and started having left arm numbness and weakness in the legs and was heading to the ER as a passenger in a car driven by  and by the time they came to the ER she slumped over.  Was feeling pounding heart rate in her head.  Did not check her heart rate.  Had to gain all her strength just to get her arms up to wrap around the  to be put on a stretcher.  Patient has a 14-day monitor on currently.  Patient has a rheumatology appointment next month.    Patient's arterial blood pressure is 130/81, heart rate 71, O2 sat of 99% on room air.    Review of records: ER visit 2023 revealed normal magnesium, TSH, HS troponin, D-dimer, proBNP less than 36, CMP with a glucose of 154, CBC with a white count of 11.2.  Normal chest x-ray.  EKG done 2023 reviewed/interpreted by me reveals sinus rhythm with rate of 96 bpm    Assessment:  :    Palpitations  Chest pain  Hyperglycemia  Obesity with BMI over  30      Recommendations / Plan:        Reviewed EKG results with patient.  Patient is undergoing Holter monitor at the current time will review Holter.  Check echocardiogram to rule out structural heart disease.  Patient probably has musculoskeletal chest pain and potentially an arrhythmia causing her to slump over.  Will look at the monitor and echo and follow-up.  Patient has an iWatch advised her to wear the iWatch and give readings if they are abnormal.  Likelihood of CAD is low in this age group.  Will check a vascular screen.  Will follow-up and consider further evaluation treatment.           Diagnosis Plan   1. Palpitations  Adult Transthoracic Echo Complete W/ Cont if Necessary Per Protocol    Vascular Screening (Bundle) CAR      2. Chest pain, unspecified type  Adult Transthoracic Echo Complete W/ Cont if Necessary Per Protocol    Vascular Screening (Bundle) CAR      3. Syncope and collapse  Adult Transthoracic Echo Complete W/ Cont if Necessary Per Protocol    Vascular Screening (Bundle) CAR      4. Obesity (BMI 30-39.9)  Adult Transthoracic Echo Complete W/ Cont if Necessary Per Protocol    Vascular Screening (Bundle) CAR                 Past Medical History:  Past Medical History:   Diagnosis Date    Anxiety     Depression     [facility] [place] spotted fever      Past Surgical History:  Past Surgical History:   Procedure Laterality Date     SECTION      CHOLECYSTECTOMY      GALLBLADDER SURGERY      TUBAL ABDOMINAL LIGATION      WISDOM TOOTH EXTRACTION      WISDOM TOOTH EXTRACTION        Allergies:  No Known Allergies  Home Meds:    Current Meds:     Current Outpatient Medications:     celecoxib (CeleBREX) 200 MG capsule, Take 1 capsule by mouth 2 (Two) Times a Day As Needed for Mild Pain., Disp: 60 capsule, Rfl: 1    cyclobenzaprine (FLEXERIL) 5 MG tablet, Take 1 tablet by mouth 3 (Three) Times a Day As Needed for Muscle Spasms. Can take 1-2 tablets by mouth three times a day, Disp: 30 tablet, Rfl:  "1    topiramate (TOPAMAX) 25 MG tablet, TAKE 1 TABLET BY MOUTH DAILY, Disp: 90 tablet, Rfl: 0  Social History:   Social History     Tobacco Use    Smoking status: Never    Smokeless tobacco: Never   Substance Use Topics    Alcohol use: Not Currently     Alcohol/week: 1.0 standard drink of alcohol     Types: 1 Glasses of wine per week      Family History:  Family History   Problem Relation Age of Onset    Diabetes Mother     Vision loss Mother     Hyperlipidemia Mother     Rheum arthritis Father     Early death Maternal Aunt         Lupus    Lupus Maternal Aunt     Anemia Maternal Aunt     Heart attack Maternal Grandfather     Heart disease Maternal Grandfather     Heart failure Maternal Grandfather     Hypertension Maternal Grandfather     Asthma Maternal Grandmother     Arrhythmia Sister         Review of Systems : Review of Systems   Constitutional: Negative for malaise/fatigue.   Cardiovascular:  Positive for chest pain, leg swelling and palpitations. Negative for dyspnea on exertion.   Respiratory:  Positive for shortness of breath. Negative for cough.    Gastrointestinal:  Negative for abdominal pain, nausea and vomiting.   Neurological:  Positive for dizziness and numbness. Negative for focal weakness, headaches and light-headedness.   All other systems reviewed and are negative.               Constitutional:  Heart Rate:  [71] 71  BP: (130)/(81) 130/81    Physical Exam   /81 (BP Location: Left arm, Patient Position: Sitting, Cuff Size: Large Adult)   Pulse 71   Ht 157.5 cm (62\")   Wt 76.7 kg (169 lb)   LMP  (LMP Unknown)   SpO2 99%   BMI 30.91 kg/m²   Physical Exam  General:  Appears in no acute distress  Eyes: Sclerae are anicteric,  conjunctivae are clear   HEENT:  No JVD. Thyroid not visibly enlarged. No mucosal pallor or cyanosis  Respiratory: Respirations regular and unlabored at rest.  Bilaterally good breath sounds with good air entry in all fields. No crackles, rubs or wheezes " auscultated  Cardiovascular: S1,S2 Regular rate and rhythm. No murmur, rub or gallop auscultated. No pretibial pitting edema  Gastrointestinal: Abdomen soft, flat, nontender. Bowel sounds present.   Musculoskeletal:  No abnormal movements  Extremities: No digital clubbing or cyanosis  Skin: Color pink. Skin warm and dry to touch. No rashes  No xanthoma  Neuro: Alert and awake, no lateralizing deficits appreciated    Cardiographics  ECG: EKG tracing was  personally reviewed/interpreted by me from 11/28/2023 is as above      Imaging  Chest X-ray:   Imaging Results (Last 24 Hours)       ** No results found for the last 24 hours. **            Lab Review: I have reviewed the labs  Results from last 7 days   Lab Units 11/28/23  1354   HSTROP T ng/L <6     Results from last 7 days   Lab Units 11/28/23  1354   MAGNESIUM mg/dL 1.9     Results from last 7 days   Lab Units 11/28/23  1354   SODIUM mmol/L 138   POTASSIUM mmol/L 4.0   BUN mg/dL 10   CREATININE mg/dL 0.64   CALCIUM mg/dL 9.8         Results from last 7 days   Lab Units 11/28/23  1354   PROBNP pg/mL <36.0     Results from last 7 days   Lab Units 11/28/23  1354   WBC 10*3/mm3 11.20*   HEMOGLOBIN g/dL 13.4   HEMATOCRIT % 39.1   PLATELETS 10*3/mm3 262     Results from last 7 days   Lab Units 11/28/23  1354   INR  0.97   APTT seconds 27.6*             Matt Valenzuela MD  12/4/2023, 10:42 EST      EMR Dragon/Transcription:   Dictated utilizing Dragon dictation

## 2023-12-04 ENCOUNTER — OFFICE VISIT (OUTPATIENT)
Dept: CARDIOLOGY | Facility: CLINIC | Age: 33
End: 2023-12-04
Payer: OTHER GOVERNMENT

## 2023-12-04 VITALS
SYSTOLIC BLOOD PRESSURE: 130 MMHG | DIASTOLIC BLOOD PRESSURE: 81 MMHG | HEART RATE: 71 BPM | OXYGEN SATURATION: 99 % | WEIGHT: 169 LBS | BODY MASS INDEX: 31.1 KG/M2 | HEIGHT: 62 IN

## 2023-12-04 DIAGNOSIS — E66.9 OBESITY (BMI 30-39.9): ICD-10-CM

## 2023-12-04 DIAGNOSIS — R00.2 PALPITATIONS: Primary | ICD-10-CM

## 2023-12-04 DIAGNOSIS — R07.9 CHEST PAIN, UNSPECIFIED TYPE: ICD-10-CM

## 2023-12-04 DIAGNOSIS — R55 SYNCOPE AND COLLAPSE: ICD-10-CM

## 2023-12-04 PROCEDURE — 99204 OFFICE O/P NEW MOD 45 MIN: CPT | Performed by: INTERNAL MEDICINE

## 2023-12-05 ENCOUNTER — PATIENT ROUNDING (BHMG ONLY) (OUTPATIENT)
Dept: CARDIOLOGY | Facility: CLINIC | Age: 33
End: 2023-12-05
Payer: OTHER GOVERNMENT

## 2023-12-05 NOTE — PROGRESS NOTES
A My-Chart message has been sent to the patient for PATIENT ROUNDING with [facility]

## 2023-12-18 ENCOUNTER — TELEPHONE (OUTPATIENT)
Dept: FAMILY MEDICINE CLINIC | Facility: CLINIC | Age: 33
End: 2023-12-18
Payer: OTHER GOVERNMENT

## 2023-12-18 NOTE — TELEPHONE ENCOUNTER
----- Message from Lambert Melara MD sent at 12/18/2023  7:20 AM EST -----  Please let the patient know that her Holter monitor was benign, she did have some premature beats which are normal variant.  No other issues noted on the Holter monitor.  She should let me know if she has more palpitations.  Thanks   C3F8 16% DRAWN UP BY AB/RF

## 2023-12-18 NOTE — TELEPHONE ENCOUNTER
Hub to read  Please let the patient know that her Holter monitor was benign, she did have some premature beats which are normal variant.  No other issues noted on the Holter monitor.  She should let me know if she has more palpitations.  Thanks

## 2023-12-20 ENCOUNTER — HOSPITAL ENCOUNTER (OUTPATIENT)
Dept: CARDIOLOGY | Facility: HOSPITAL | Age: 33
Discharge: HOME | End: 2023-12-20
Admitting: INTERNAL MEDICINE

## 2023-12-20 DIAGNOSIS — R00.2 PALPITATIONS: ICD-10-CM

## 2023-12-20 DIAGNOSIS — E66.9 OBESITY (BMI 30-39.9): ICD-10-CM

## 2023-12-20 DIAGNOSIS — R07.9 CHEST PAIN, UNSPECIFIED TYPE: ICD-10-CM

## 2023-12-20 DIAGNOSIS — R55 SYNCOPE AND COLLAPSE: ICD-10-CM

## 2023-12-20 PROCEDURE — 93799 UNLISTED CV SVC/PROCEDURE: CPT

## 2023-12-22 LAB
BH CV VAS SCREENING CAROTID CCA LEFT: 103 CM/SEC
BH CV VAS SCREENING CAROTID CCA RIGHT: 100 CM/SEC
BH CV VAS SCREENING CAROTID ICA LEFT: 94 CM/SEC
BH CV VAS SCREENING CAROTID ICA RIGHT: 103 CM/SEC
BH CV XLRA MEAS - MID AO DIAM: 1.5 CM
BH CV XLRA MEAS - PAD LEFT ABI PT: 1.16
BH CV XLRA MEAS - PAD LEFT ARM: 128 MMHG
BH CV XLRA MEAS - PAD LEFT LEG PT: 148 MMHG
BH CV XLRA MEAS - PAD RIGHT ABI PT: 1.24
BH CV XLRA MEAS - PAD RIGHT ARM: 126 MMHG
BH CV XLRA MEAS - PAD RIGHT LEG PT: 159 MMHG
BH CV XLRA MEAS LEFT DIST CCA EDV: -23.6 CM/SEC
BH CV XLRA MEAS LEFT DIST CCA PSV: -103 CM/SEC
BH CV XLRA MEAS LEFT ICA/CCA RATIO: 0.9
BH CV XLRA MEAS LEFT PROX ICA EDV: -39.8 CM/SEC
BH CV XLRA MEAS LEFT PROX ICA PSV: -93.8 CM/SEC
BH CV XLRA MEAS RIGHT DIST CCA EDV: -31.1 CM/SEC
BH CV XLRA MEAS RIGHT DIST CCA PSV: -100 CM/SEC
BH CV XLRA MEAS RIGHT ICA/CCA RATIO: 1
BH CV XLRA MEAS RIGHT PROX ICA EDV: -34.2 CM/SEC
BH CV XLRA MEAS RIGHT PROX ICA PSV: -103 CM/SEC

## 2024-01-07 NOTE — PROGRESS NOTES
Subjective:     Encounter Date:2024      Patient ID: Kayleigh Weaver is a 33 y.o. female.    Chief Complaint and history of present illness:     Here for evaluation of palpitations and test results of echo and Holter.     History of Present Illness:       Ms. Kayleigh Weaver has PMH of     [facility] spotted fever  Anxiety, depression  , cholecystectomy, tubal ligation     Here for test results of echo and Holter.  Patient was seen for evaluation of palpitations.  Patient was in the emergency room 2023 with complaint of palpitations.  Patient recently has been having issues with arthritis and muscle weakness, left-sided numbness and had BHUMIKA positive and recently was started on Topamax Celebrex and Flexeril for possible lupus and migraines.  2 weeks later patient h has been noticing palpitations where her heart rates go up to 1 17-1 20 last anywhere up to 30 minutes happens every other day or every day for the last 2 weeks.  Last Thursday had severe midsternal chest pain which got progressively worse and started having left arm numbness and weakness in the legs and was heading to the ER as a passenger in a car driven by  and by the time they came to the ER she slumped over.  Was feeling pounding heart rate in her head.  Did not check her heart rate.  Had to gain all her strength just to get her arms up to wrap around the  to be put on a stretcher, thought she passed out..  Patient has a 14-day monitor on currently.  Patient has a rheumatology appointment, they are back pain and has not seen them yet.  Since seen last time patient does not have any further cardiac symptoms.     Patient's arterial blood pressure is 130/84, heart rate 69, O2 sat of 98% on room air.  BMI is over 30.     Review of records: ER visit 2023 revealed normal magnesium, TSH, HS troponin, D-dimer, proBNP less than 36, CMP with a glucose of 154, CBC with a white count of 11.2.  Normal chest x-ray.  EKG  done 2023 reviewed/interpreted by me reveals sinus rhythm with rate of 96 bpm     Assessment:  :     Palpitations  Chest pain  Syncope  Hyperglycemia  Obesity with BMI over 30        Recommendations / Plan:         Reviewed echo and Holter results which were normal with patient and her .  Advised patient to get her iWatch charged and call if the readings are abnormal.  Advised patient to call me back if she has any symptoms.  Patient had vascular screening 2023 which was normal.  Patient's syncope was vasovagal.  Will continue to observe and reassess if she has further symptoms.  Will follow her iWatch for palpitation evaluation and treatment in the future.         Procedures    Vascular screen 2023 normal  Echocardiogram 2024 was normal EF of 65%  Holter monitor 2023 was normal.    Copied text in this portion of the note has been reviewed and is accurate as of 2024  The following portions of the patient's history were reviewed and updated as appropriate: allergies, current medications, past family history, past medical history, past social history, past surgical history and problem list.    Assessment:         MDM       Diagnosis Plan   1. Palpitations        2. Syncope and collapse        3. Obesity (BMI 30-39.9)               Plan:               Past Medical History:  Past Medical History:   Diagnosis Date    Anxiety     Depression     [facility] [place] spotted fever      Past Surgical History:  Past Surgical History:   Procedure Laterality Date     SECTION      CHOLECYSTECTOMY      GALLBLADDER SURGERY      TUBAL ABDOMINAL LIGATION      WISDOM TOOTH EXTRACTION      WISDOM TOOTH EXTRACTION        Allergies:  No Known Allergies  Home Meds:  Current Meds:     Current Outpatient Medications:     celecoxib (CeleBREX) 200 MG capsule, Take 1 capsule by mouth 2 (Two) Times a Day As Needed for Mild Pain., Disp: 60 capsule, Rfl: 1    cyclobenzaprine (FLEXERIL) 5 MG tablet, Take 1  "tablet by mouth 3 (Three) Times a Day As Needed for Muscle Spasms. Can take 1-2 tablets by mouth three times a day, Disp: 30 tablet, Rfl: 1    topiramate (TOPAMAX) 25 MG tablet, TAKE 1 TABLET BY MOUTH DAILY, Disp: 90 tablet, Rfl: 0  Social History:   Social History     Tobacco Use    Smoking status: Never    Smokeless tobacco: Never   Substance Use Topics    Alcohol use: Not Currently     Alcohol/week: 1.0 standard drink of alcohol     Types: 1 Glasses of wine per week      Family History:  Family History   Problem Relation Age of Onset    Diabetes Mother     Vision loss Mother     Hyperlipidemia Mother     Rheum arthritis Father     Early death Maternal Aunt         Lupus    Lupus Maternal Aunt     Anemia Maternal Aunt     Heart attack Maternal Grandfather     Heart disease Maternal Grandfather     Heart failure Maternal Grandfather     Hypertension Maternal Grandfather     Asthma Maternal Grandmother     Arrhythmia Sister               Review of Systems   Cardiovascular:  Positive for palpitations. Negative for chest pain and leg swelling.   Respiratory:  Negative for shortness of breath.    Neurological:  Negative for dizziness and numbness.     All other systems are negative         Objective:     Physical Exam  /84 (BP Location: Left arm, Patient Position: Sitting, Cuff Size: Adult)   Pulse 69   Ht 157.5 cm (62\")   Wt 76.2 kg (168 lb)   SpO2 98%   BMI 30.73 kg/m²   General:  Appears in no acute distress  Eyes: Sclera is anicteric,  conjunctiva is clear   HEENT:  No JVD.  No carotid bruits  Respiratory: Respirations regular and unlabored at rest.  Clear to auscultation  Cardiovascular: S1,S2 Regular rate and rhythm. .   Extremities: No digital clubbing or cyanosis, no edema  Skin: Color pink. Skin warm and dry to touch. No rashes  No xanthoma  Neuro: Alert and awake.    Lab Reviewed:         Matt Valenzuela MD  1/8/2024 11:04 EST      EMR Dragon/Transcription:   \"Dictated utilizing Dragon " "dictation\".        "

## 2024-01-08 ENCOUNTER — OFFICE VISIT (OUTPATIENT)
Dept: CARDIOLOGY | Facility: CLINIC | Age: 34
End: 2024-01-08
Payer: OTHER GOVERNMENT

## 2024-01-08 ENCOUNTER — HOSPITAL ENCOUNTER (OUTPATIENT)
Dept: CARDIOLOGY | Facility: HOSPITAL | Age: 34
Discharge: HOME | End: 2024-01-08
Admitting: INTERNAL MEDICINE
Payer: OTHER GOVERNMENT

## 2024-01-08 VITALS
HEART RATE: 69 BPM | BODY MASS INDEX: 30.91 KG/M2 | DIASTOLIC BLOOD PRESSURE: 84 MMHG | HEIGHT: 62 IN | SYSTOLIC BLOOD PRESSURE: 130 MMHG | OXYGEN SATURATION: 98 % | WEIGHT: 168 LBS

## 2024-01-08 DIAGNOSIS — R00.2 PALPITATIONS: ICD-10-CM

## 2024-01-08 DIAGNOSIS — R07.9 CHEST PAIN, UNSPECIFIED TYPE: ICD-10-CM

## 2024-01-08 DIAGNOSIS — R55 SYNCOPE AND COLLAPSE: ICD-10-CM

## 2024-01-08 DIAGNOSIS — E66.9 OBESITY (BMI 30-39.9): ICD-10-CM

## 2024-01-08 DIAGNOSIS — R00.2 PALPITATIONS: Primary | ICD-10-CM

## 2024-01-08 LAB
BH CV ECHO MEAS - ACS: 1.89 CM
BH CV ECHO MEAS - AO MAX PG: 5.3 MMHG
BH CV ECHO MEAS - AO MEAN PG: 3.3 MMHG
BH CV ECHO MEAS - AO ROOT DIAM: 2.9 CM
BH CV ECHO MEAS - AO V2 MAX: 114.6 CM/SEC
BH CV ECHO MEAS - AO V2 VTI: 26.7 CM
BH CV ECHO MEAS - AVA(I,D): 2.16 CM2
BH CV ECHO MEAS - EDV(CUBED): 89.5 ML
BH CV ECHO MEAS - EDV(MOD-SP4): 84.1 ML
BH CV ECHO MEAS - EF(MOD-BP): 58 %
BH CV ECHO MEAS - EF(MOD-SP4): 58.5 %
BH CV ECHO MEAS - ESV(CUBED): 31.6 ML
BH CV ECHO MEAS - ESV(MOD-SP4): 34.9 ML
BH CV ECHO MEAS - FS: 29.3 %
BH CV ECHO MEAS - IVS/LVPW: 0.92 CM
BH CV ECHO MEAS - IVSD: 0.65 CM
BH CV ECHO MEAS - LA DIMENSION: 3.8 CM
BH CV ECHO MEAS - LV MASS(C)D: 91 GRAMS
BH CV ECHO MEAS - LV MAX PG: 5.3 MMHG
BH CV ECHO MEAS - LV MEAN PG: 2.25 MMHG
BH CV ECHO MEAS - LV V1 MAX: 115.3 CM/SEC
BH CV ECHO MEAS - LV V1 VTI: 22.9 CM
BH CV ECHO MEAS - LVIDD: 4.5 CM
BH CV ECHO MEAS - LVIDS: 3.2 CM
BH CV ECHO MEAS - LVOT AREA: 2.5 CM2
BH CV ECHO MEAS - LVOT DIAM: 1.79 CM
BH CV ECHO MEAS - LVPWD: 0.71 CM
BH CV ECHO MEAS - MV A MAX VEL: 46.5 CM/SEC
BH CV ECHO MEAS - MV DEC SLOPE: 395.3 CM/SEC2
BH CV ECHO MEAS - MV DEC TIME: 0.19 SEC
BH CV ECHO MEAS - MV E MAX VEL: 76.1 CM/SEC
BH CV ECHO MEAS - MV E/A: 1.64
BH CV ECHO MEAS - MV MAX PG: 4 MMHG
BH CV ECHO MEAS - MV MEAN PG: 1.73 MMHG
BH CV ECHO MEAS - MV V2 VTI: 24.1 CM
BH CV ECHO MEAS - MVA(VTI): 2.4 CM2
BH CV ECHO MEAS - PA ACC TIME: 0.15 SEC
BH CV ECHO MEAS - PULM A REVS DUR: 0.07 SEC
BH CV ECHO MEAS - PULM A REVS VEL: 24.5 CM/SEC
BH CV ECHO MEAS - PULM DIAS VEL: 44.7 CM/SEC
BH CV ECHO MEAS - PULM S/D: 0.95
BH CV ECHO MEAS - PULM SYS VEL: 42.5 CM/SEC
BH CV ECHO MEAS - RAP SYSTOLE: 3 MMHG
BH CV ECHO MEAS - RV MAX PG: 1.16 MMHG
BH CV ECHO MEAS - RV V1 MAX: 53.8 CM/SEC
BH CV ECHO MEAS - RV V1 VTI: 12.1 CM
BH CV ECHO MEAS - RVDD: 2.46 CM
BH CV ECHO MEAS - SV(LVOT): 57.7 ML
BH CV ECHO MEAS - SV(MOD-SP4): 49.2 ML

## 2024-01-08 PROCEDURE — 99214 OFFICE O/P EST MOD 30 MIN: CPT | Performed by: INTERNAL MEDICINE

## 2024-01-08 PROCEDURE — 93306 TTE W/DOPPLER COMPLETE: CPT | Performed by: INTERNAL MEDICINE

## 2024-01-08 PROCEDURE — 93356 MYOCRD STRAIN IMG SPCKL TRCK: CPT | Performed by: INTERNAL MEDICINE

## 2024-01-08 PROCEDURE — 93306 TTE W/DOPPLER COMPLETE: CPT

## 2024-01-08 PROCEDURE — 93356 MYOCRD STRAIN IMG SPCKL TRCK: CPT

## 2024-01-31 RX ORDER — TOPIRAMATE 25 MG/1
25 TABLET ORAL DAILY
Qty: 90 TABLET | Refills: 0 | Status: SHIPPED | OUTPATIENT
Start: 2024-01-31

## 2024-02-23 RX ORDER — CELECOXIB 200 MG/1
200 CAPSULE ORAL 2 TIMES DAILY PRN
Qty: 60 CAPSULE | Refills: 1 | Status: SHIPPED | OUTPATIENT
Start: 2024-02-23

## 2024-03-04 ENCOUNTER — OFFICE VISIT (OUTPATIENT)
Dept: FAMILY MEDICINE CLINIC | Facility: CLINIC | Age: 34
End: 2024-03-04
Payer: OTHER GOVERNMENT

## 2024-03-04 VITALS
HEART RATE: 80 BPM | OXYGEN SATURATION: 98 % | RESPIRATION RATE: 20 BRPM | DIASTOLIC BLOOD PRESSURE: 78 MMHG | SYSTOLIC BLOOD PRESSURE: 124 MMHG

## 2024-03-04 DIAGNOSIS — G43.519 INTRACTABLE PERSISTENT MIGRAINE AURA WITHOUT CEREBRAL INFARCTION AND WITHOUT STATUS MIGRAINOSUS: Primary | ICD-10-CM

## 2024-03-04 DIAGNOSIS — T78.1XXA ALLERGIC REACTION TO ALPHA-GAL: ICD-10-CM

## 2024-03-04 DIAGNOSIS — R76.8 POSITIVE ANA (ANTINUCLEAR ANTIBODY): ICD-10-CM

## 2024-03-04 DIAGNOSIS — M25.59 PAIN IN OTHER JOINT: ICD-10-CM

## 2024-03-04 RX ORDER — CLINDAMYCIN HYDROCHLORIDE 300 MG/1
CAPSULE ORAL
COMMUNITY
Start: 2024-03-03

## 2024-03-04 RX ORDER — PREDNISONE 10 MG/1
TABLET ORAL
COMMUNITY
Start: 2024-03-03

## 2024-03-04 NOTE — PROGRESS NOTES
"Answers submitted by the patient for this visit:  Other (Submitted on 3/2/2024)  Please describe your symptoms.: Followup of rheumatologist findings/future plan of care  Have you had these symptoms before?: Yes  How long have you been having these symptoms?: Greater than 2 weeks  Primary Reason for Visit (Submitted on 3/2/2024)  What is the primary reason for your visit?: Other  Chief Complaint  Follow-up    Subjective        Kayleigh Weaver presents to [facility]  History of Present Illness  Teresita is here for follow-up of a couple different things.  She was recently seen by rheumatology a few months ago and was diagnosed with allergic reaction to alpha gal.  She had a positive BHUMIKA test and some arthralgias.  She said since getting back on her red meat and mammalian meat intake it has been somewhat better with her inflammation.  She states that she has been doing better overall.  She had a recent infection with cellulitis from tattoos and is now recovering from that.     Migraines  She states that she has been taking Topamax every day without much relief of her migraine symptoms.  She states that it has not been helpful in that regard.  States that she can still get some pretty frequently at least 2-3 times a week.  Has been using other over-the-counter's to help with this.        Objective   Vital Signs:  /78   Pulse 80   Resp 20   SpO2 98%   Estimated body mass index is 30.73 kg/m² as calculated from the following:    Height as of 1/8/24: 157.5 cm (62\").    Weight as of 1/8/24: 76.2 kg (168 lb).               Physical Exam  Vitals and nursing note reviewed.   Constitutional:       General: She is not in acute distress.     Appearance: Normal appearance.   Cardiovascular:      Rate and Rhythm: Normal rate and regular rhythm.      Pulses: Normal pulses.      Heart sounds: Normal heart sounds. No murmur heard.  Pulmonary:      Effort: Pulmonary effort is normal. No " respiratory distress.      Breath sounds: Normal breath sounds.   Abdominal:      General: Abdomen is flat. Bowel sounds are normal.      Palpations: Abdomen is soft.      Tenderness: There is no abdominal tenderness.   Musculoskeletal:      Right lower leg: No edema.      Left lower leg: No edema.   Neurological:      Mental Status: She is alert and oriented to person, place, and time. Mental status is at baseline.   Psychiatric:         Mood and Affect: Mood normal.         Behavior: Behavior normal.      Result Review :                     Assessment and Plan     Diagnoses and all orders for this visit:    1. Intractable persistent migraine aura without cerebral infarction and without status migrainosus (Primary)    2. Positive BHUMIKA (antinuclear antibody)    3. Pain in other joint    4. Allergic reaction to alpha-gal    Other orders  -     Rimegepant Sulfate (NURTEC) 75 MG tablet dispersible tablet; Take 1 tablet by mouth Every Other Day.  Dispense: 16 tablet; Refill: 1    For migraines, not doing much better.  Will take her for Topamax daily and switch her to Nurtec.  Overall we will see how she does.  Otherwise otherwise meaning may need to do more things.  For her.    She can continue to use Flexeril and Celebrex for nerve pain.  Discussed some use of Cymbalta if needed.    Alpha-gal  Continue following with rheumatology.  Continue avoid mammalian meat.  Will continue to see how she does with that.         Follow Up     Return in about 8 months (around 11/4/2024) for Annual physical.  Patient was given instructions and counseling regarding her condition or for health maintenance advice. Please see specific information pulled into the AVS if appropriate.

## 2024-05-09 RX ORDER — TOPIRAMATE 25 MG/1
25 TABLET ORAL DAILY
Qty: 90 TABLET | Refills: 0 | Status: SHIPPED | OUTPATIENT
Start: 2024-05-09

## 2024-05-30 ENCOUNTER — LAB (OUTPATIENT)
Dept: FAMILY MEDICINE CLINIC | Facility: CLINIC | Age: 34
End: 2024-05-30
Payer: OTHER GOVERNMENT

## 2024-05-30 ENCOUNTER — OFFICE VISIT (OUTPATIENT)
Dept: FAMILY MEDICINE CLINIC | Facility: CLINIC | Age: 34
End: 2024-05-30
Payer: OTHER GOVERNMENT

## 2024-05-30 VITALS
BODY MASS INDEX: 31.33 KG/M2 | DIASTOLIC BLOOD PRESSURE: 72 MMHG | OXYGEN SATURATION: 99 % | SYSTOLIC BLOOD PRESSURE: 118 MMHG | RESPIRATION RATE: 16 BRPM | WEIGHT: 170.25 LBS | HEIGHT: 62 IN

## 2024-05-30 DIAGNOSIS — G43.519 INTRACTABLE PERSISTENT MIGRAINE AURA WITHOUT CEREBRAL INFARCTION AND WITHOUT STATUS MIGRAINOSUS: ICD-10-CM

## 2024-05-30 DIAGNOSIS — R76.8 POSITIVE ANA (ANTINUCLEAR ANTIBODY): ICD-10-CM

## 2024-05-30 DIAGNOSIS — M62.81 MUSCLE WEAKNESS OF LEFT ARM: ICD-10-CM

## 2024-05-30 DIAGNOSIS — R41.3 MEMORY LOSS OF UNKNOWN CAUSE: Primary | ICD-10-CM

## 2024-05-30 DIAGNOSIS — M62.81 ACUTE LEFT-SIDED MUSCLE WEAKNESS: ICD-10-CM

## 2024-05-30 DIAGNOSIS — M79.2 NERVE PAIN: ICD-10-CM

## 2024-05-30 PROCEDURE — 36415 COLL VENOUS BLD VENIPUNCTURE: CPT | Performed by: STUDENT IN AN ORGANIZED HEALTH CARE EDUCATION/TRAINING PROGRAM

## 2024-05-30 PROCEDURE — 86160 COMPLEMENT ANTIGEN: CPT | Performed by: STUDENT IN AN ORGANIZED HEALTH CARE EDUCATION/TRAINING PROGRAM

## 2024-05-30 PROCEDURE — 86038 ANTINUCLEAR ANTIBODIES: CPT | Performed by: STUDENT IN AN ORGANIZED HEALTH CARE EDUCATION/TRAINING PROGRAM

## 2024-05-30 PROCEDURE — 82550 ASSAY OF CK (CPK): CPT | Performed by: STUDENT IN AN ORGANIZED HEALTH CARE EDUCATION/TRAINING PROGRAM

## 2024-05-30 PROCEDURE — 99214 OFFICE O/P EST MOD 30 MIN: CPT | Performed by: STUDENT IN AN ORGANIZED HEALTH CARE EDUCATION/TRAINING PROGRAM

## 2024-05-30 PROCEDURE — 86376 MICROSOMAL ANTIBODY EACH: CPT | Performed by: STUDENT IN AN ORGANIZED HEALTH CARE EDUCATION/TRAINING PROGRAM

## 2024-05-30 PROCEDURE — 85652 RBC SED RATE AUTOMATED: CPT | Performed by: STUDENT IN AN ORGANIZED HEALTH CARE EDUCATION/TRAINING PROGRAM

## 2024-05-30 PROCEDURE — 82607 VITAMIN B-12: CPT | Performed by: STUDENT IN AN ORGANIZED HEALTH CARE EDUCATION/TRAINING PROGRAM

## 2024-05-30 PROCEDURE — 86140 C-REACTIVE PROTEIN: CPT | Performed by: STUDENT IN AN ORGANIZED HEALTH CARE EDUCATION/TRAINING PROGRAM

## 2024-05-30 RX ORDER — PREGABALIN 25 MG/1
25 CAPSULE ORAL 2 TIMES DAILY PRN
Qty: 60 CAPSULE | Refills: 1 | Status: SHIPPED | OUTPATIENT
Start: 2024-05-30

## 2024-05-30 NOTE — PROGRESS NOTES
"Answers submitted by the patient for this visit:  Other (Submitted on 5/23/2024)  Please describe your symptoms.: Worsening brain fog, mobility limitations, migrianes w/ vision changes, pain and fatigue with no improvement after diet change. Symptoms are dehabilitating  Have you had these symptoms before?: Yes  How long have you been having these symptoms?: Greater than 2 weeks  Please list any medications you are currently taking for this condition.: Celebrex, toprimate, nurtec  Primary Reason for Visit (Submitted on 5/23/2024)  What is the primary reason for your visit?: Other  Chief Complaint  Fatigue, Pain (All over), Memory Loss, and Headache    Subjective        Kayleigh Weaver presents to [facility]  History of Present Illness  Kayleigh is a 33-year-old who presents today due to worsening brain fog, mobility limitations, migraines with vision changes, pain and fatigue with no improvement after her diet changes.  She states that they have been ongoing for the last couple months.  She has tried Celebrex and topiramate and Nurtec without much change in symptoms.  She feels like she is losing herself.  She feels like she cannot get a  of things.  She feels like she cannot be as active as she used to.  She is not enjoying anything.  Has not had any other sick symptoms.  Has tried things for her pain.  Feels like she only thing that helps is Flexeril.  States that she just feels like she cannot catch her .  Others in room and correlates symptoms.    She recently had a positive BHUMIKA test and followed with rheumatology.  They did a further workup which was inconclusive.  They recommended to repeat labs.  She has not done that.  Objective   Vital Signs:  /72   Resp 16   Ht 157.5 cm (62\")   Wt 77.2 kg (170 lb 4 oz)   SpO2 99%   BMI 31.14 kg/m²   Estimated body mass index is 31.14 kg/m² as calculated from the following:    Height as of this encounter: 157.5 cm (62\").    " Weight as of this encounter: 77.2 kg (170 lb 4 oz).               Physical Exam  Vitals and nursing note reviewed.   Constitutional:       General: She is not in acute distress.     Appearance: Normal appearance.   Cardiovascular:      Rate and Rhythm: Normal rate and regular rhythm.      Pulses: Normal pulses.      Heart sounds: Normal heart sounds. No murmur heard.  Pulmonary:      Effort: Pulmonary effort is normal. No respiratory distress.      Breath sounds: Normal breath sounds.   Abdominal:      General: Abdomen is flat. Bowel sounds are normal.      Palpations: Abdomen is soft.      Tenderness: There is no abdominal tenderness.   Musculoskeletal:      Right lower leg: No edema.      Left lower leg: No edema.   Neurological:      General: No focal deficit present.      Mental Status: She is alert and oriented to person, place, and time. Mental status is at baseline.      Cranial Nerves: No cranial nerve deficit.      Motor: No weakness.      Deep Tendon Reflexes: Reflexes normal.   Psychiatric:         Mood and Affect: Mood normal.         Behavior: Behavior normal.        Result Review :    The following data was reviewed by: Lambert Melara MD on 05/30/2024:  Common labs          10/16/2023    22:12 11/28/2023    13:54   Common Labs   Glucose 90  154    BUN 9  10    Creatinine 0.64  0.64    Sodium 138  138    Potassium 3.7  4.0    Chloride 101  104    Calcium 9.3  9.8    Albumin 4.3  4.3    Total Bilirubin 0.2  0.3    Alkaline Phosphatase 76  68    AST (SGOT) 18  17    ALT (SGPT) 16  15    WBC 10.60  11.20    Hemoglobin 13.0  13.4    Hematocrit 39.1  39.1    Platelets 271  262      Data reviewed : Previous notes             Assessment and Plan     Diagnoses and all orders for this visit:    1. Memory loss of unknown cause (Primary)  -     Cancel: MRI Brain Without Contrast; Future  -     Sedimentation rate, automated  -     CK  -     Thyroid Peroxidase Antibody  -     Vitamin B12  -     C-reactive  protein  -     BHUMIKA Direct Reflex to 11 Biomarker  -     C4+C3  -     MRI Brain Without Contrast; Future    2. Intractable persistent migraine aura without cerebral infarction and without status migrainosus    3. Positive BHUMIKA (antinuclear antibody)  -     Sedimentation rate, automated  -     CK  -     Thyroid Peroxidase Antibody  -     Vitamin B12  -     C-reactive protein  -     BHUMIKA Direct Reflex to 11 Biomarker  -     C4+C3    4. Muscle weakness of left arm    5. Acute left-sided muscle weakness  -     MRI Cervical Spine Without Contrast; Future  -     Accessories: Walker, Cane or Crutch    6. Nerve pain  -     pregabalin (Lyrica) 25 MG capsule; Take 1 capsule by mouth 2 (Two) Times a Day As Needed (nerve pain).  Dispense: 60 capsule; Refill: 1    For her memory loss, positive BHUMIKA, migraines, muscle weakness, left-sided muscle weakness and nerve pain will prescribe Lyrica.  Will also get MRI of the brain without contrast for further evaluation and also MRI cervical spine.  Sed rate, CK, B12, BHUMIKA, C3-C4.  Depending on those results we can see if there is anything that can be the cause of her issues.  I do worry that this is fibromyalgia, but will have to rule out everything else.  Normal reflexes so I do not think it is MS.  She did want a walker.  Order has been placed.         Follow Up     Return if symptoms worsen or fail to improve.  Patient was given instructions and counseling regarding her condition or for health maintenance advice. Please see specific information pulled into the AVS if appropriate.

## 2024-05-31 LAB
C3 SERPL-MCNC: 126 MG/DL (ref 82–167)
C4 SERPL-MCNC: 17 MG/DL (ref 14–44)
CK SERPL-CCNC: 81 U/L (ref 20–180)
CRP SERPL-MCNC: <0.3 MG/DL (ref 0–0.5)
ERYTHROCYTE [SEDIMENTATION RATE] IN BLOOD: 16 MM/HR (ref 0–20)
VIT B12 BLD-MCNC: 559 PG/ML (ref 211–946)

## 2024-06-01 LAB — THYROPEROXIDASE AB SERPL-ACNC: 13 IU/ML (ref 0–34)

## 2024-06-03 LAB — ANA SER QL: NEGATIVE

## 2024-06-04 ENCOUNTER — TELEPHONE (OUTPATIENT)
Dept: FAMILY MEDICINE CLINIC | Facility: CLINIC | Age: 34
End: 2024-06-04
Payer: OTHER GOVERNMENT

## 2024-06-04 NOTE — TELEPHONE ENCOUNTER
"  Caller: Kayleigh Weaver \"Teresita\"    Relationship: Self    Best call back number: [phone]    What was the call regarding: PATIENT STATES THAT SHE COULD NOT HAVE HER MRI DONE WITH HER PHYSICAL ORDER. PATIENT STATES THAT DR. XIONG WILL NEED TO SET THIS UP OR DO A PRIOR AUTHORIZATION THROUGH [facility].         "

## 2024-08-27 ENCOUNTER — TELEPHONE (OUTPATIENT)
Dept: FAMILY MEDICINE CLINIC | Facility: CLINIC | Age: 34
End: 2024-08-27
Payer: OTHER GOVERNMENT

## 2024-08-27 ENCOUNTER — PATIENT MESSAGE (OUTPATIENT)
Dept: FAMILY MEDICINE CLINIC | Facility: CLINIC | Age: 34
End: 2024-08-27
Payer: OTHER GOVERNMENT

## 2024-08-27 NOTE — TELEPHONE ENCOUNTER
Tried calling pt to let her know that I have her letter signed and had to LVM to call the office back.

## 2024-08-28 RX ORDER — CELECOXIB 200 MG/1
200 CAPSULE ORAL 2 TIMES DAILY PRN
Qty: 60 CAPSULE | Refills: 1 | Status: SHIPPED | OUTPATIENT
Start: 2024-08-28

## 2024-08-28 RX ORDER — TOPIRAMATE 25 MG/1
25 TABLET, FILM COATED ORAL DAILY
Qty: 90 TABLET | Refills: 0 | Status: SHIPPED | OUTPATIENT
Start: 2024-08-28

## 2024-08-29 RX ORDER — CELECOXIB 200 MG/1
200 CAPSULE ORAL 2 TIMES DAILY PRN
Qty: 60 CAPSULE | Refills: 1 | Status: SHIPPED | OUTPATIENT
Start: 2024-08-29

## 2024-08-29 RX ORDER — TOPIRAMATE 25 MG/1
25 TABLET, FILM COATED ORAL DAILY
Qty: 90 TABLET | Refills: 0 | Status: SHIPPED | OUTPATIENT
Start: 2024-08-29

## 2024-09-05 ENCOUNTER — TELEPHONE (OUTPATIENT)
Dept: FAMILY MEDICINE CLINIC | Facility: CLINIC | Age: 34
End: 2024-09-05
Payer: OTHER GOVERNMENT

## 2024-09-05 NOTE — TELEPHONE ENCOUNTER
Patient wants to speak with Lizz because she still cannot get her MRI's scheduled at [facility].     Said you can leave her a message if you get her voice mail or send her a smartcliphart message.

## 2024-09-13 ENCOUNTER — TELEPHONE (OUTPATIENT)
Dept: FAMILY MEDICINE CLINIC | Facility: CLINIC | Age: 34
End: 2024-09-13

## 2024-09-13 NOTE — TELEPHONE ENCOUNTER
"  Caller: Kayleigh Weaver \"Teresita\"    Relationship: Self    Best call back number: [phone]    What form or medical record are you requesting: WORK ACCOMMODATION FORM    Who is requesting this form or medical record from you: EMPLOYER    How would you like to receive the form or medical records (pick-up, mail, fax): FAX  If fax, what is the fax number: PATIENT STATED THE FAX NUMBER WILL BE ON COVER SHEET.      Timeframe paperwork needed: BY NEXT WEEK    Additional notes: PATIENT WILL BE FAXING A  WORK ACCOMMODATION FROM FOR [employer] OR [place] TO FILL OUT AND FAX BACK TO NUMBER ON COVER SHEET.              "

## 2024-09-16 NOTE — TELEPHONE ENCOUNTER
Tried calling pt to let her know that we never received form and had to LVM to call the office back.

## 2024-09-16 NOTE — TELEPHONE ENCOUNTER
Pt called back and said she hasn't faxed papers yet. States that she will drop them off to office soon.

## 2024-10-16 ENCOUNTER — TELEPHONE (OUTPATIENT)
Dept: FAMILY MEDICINE CLINIC | Facility: CLINIC | Age: 34
End: 2024-10-16

## 2024-10-16 ENCOUNTER — TELEPHONE (OUTPATIENT)
Dept: FAMILY MEDICINE CLINIC | Facility: CLINIC | Age: 34
End: 2024-10-16
Payer: OTHER GOVERNMENT

## 2024-10-16 DIAGNOSIS — M62.81 ACUTE LEFT-SIDED MUSCLE WEAKNESS: ICD-10-CM

## 2024-10-16 NOTE — TELEPHONE ENCOUNTER
Called pt back and discussed MRI results/MD message. She does not want referral for spine doctor at this time.

## 2024-10-16 NOTE — TELEPHONE ENCOUNTER
"      Hub staff attempted to follow warm transfer process and was unsuccessful     Caller: Kayleigh Weaver \"Teresita\"    Relationship to patient: Self    Best call back number:     [phone] (Home)       Patient is needing: RETURNING A CALL FROM [place] [facility]     CURRENTLY IN [place] FOR WORK     YOU CAN LEAVE VOICEMAIL, OR PORTAL               "

## 2024-11-28 RX ORDER — CELECOXIB 200 MG/1
200 CAPSULE ORAL 2 TIMES DAILY PRN
Qty: 60 CAPSULE | Refills: 1 | Status: SHIPPED | OUTPATIENT
Start: 2024-11-28

## 2024-11-28 RX ORDER — CYCLOBENZAPRINE HCL 5 MG
5 TABLET ORAL 3 TIMES DAILY PRN
Qty: 30 TABLET | Refills: 1 | Status: SHIPPED | OUTPATIENT
Start: 2024-11-28

## 2024-12-12 ENCOUNTER — TELEPHONE (OUTPATIENT)
Dept: FAMILY MEDICINE CLINIC | Facility: CLINIC | Age: 34
End: 2024-12-12
Payer: OTHER GOVERNMENT

## 2025-01-09 ENCOUNTER — OFFICE VISIT (OUTPATIENT)
Dept: FAMILY MEDICINE CLINIC | Facility: CLINIC | Age: 35
End: 2025-01-09
Payer: OTHER GOVERNMENT

## 2025-01-09 VITALS
OXYGEN SATURATION: 98 % | HEART RATE: 77 BPM | WEIGHT: 170 LBS | BODY MASS INDEX: 31.28 KG/M2 | DIASTOLIC BLOOD PRESSURE: 78 MMHG | HEIGHT: 62 IN | SYSTOLIC BLOOD PRESSURE: 124 MMHG | RESPIRATION RATE: 18 BRPM

## 2025-01-09 DIAGNOSIS — M54.50 CHRONIC BILATERAL LOW BACK PAIN WITHOUT SCIATICA: ICD-10-CM

## 2025-01-09 DIAGNOSIS — M25.50 ARTHRALGIA, UNSPECIFIED JOINT: ICD-10-CM

## 2025-01-09 DIAGNOSIS — G43.519 INTRACTABLE PERSISTENT MIGRAINE AURA WITHOUT CEREBRAL INFARCTION AND WITHOUT STATUS MIGRAINOSUS: ICD-10-CM

## 2025-01-09 DIAGNOSIS — M62.81 MUSCLE WEAKNESS (GENERALIZED): ICD-10-CM

## 2025-01-09 DIAGNOSIS — G89.29 CHRONIC BILATERAL LOW BACK PAIN WITHOUT SCIATICA: ICD-10-CM

## 2025-01-09 DIAGNOSIS — M79.2 NERVE PAIN: ICD-10-CM

## 2025-01-09 DIAGNOSIS — Z76.89 ENCOUNTER TO ESTABLISH CARE: Primary | ICD-10-CM

## 2025-01-09 PROCEDURE — 99214 OFFICE O/P EST MOD 30 MIN: CPT | Performed by: PHYSICIAN ASSISTANT

## 2025-01-09 NOTE — ASSESSMENT & PLAN NOTE
Discussed I would like her to discontinue the Celebrex due to potential adverse side effects, she agrees.  At this time she states she would rather take over-the-counter medication such as Tylenol as needed.

## 2025-01-09 NOTE — ASSESSMENT & PLAN NOTE
Continue to take topiramate and Nurtec.  Discussed she can also take 200 mg co-Q10 and 500 mg magnesium.  Orders:    Ambulatory Referral to Neurology

## 2025-01-09 NOTE — PROGRESS NOTES
Chief Complaint  Chief Complaint   Patient presents with    Establish [facility] [place]    Pain     Joint- Trouble walking        Subjective        Kayleigh Weaver is a 34 y.o. female who presents to [facility].  History of Present Illness  Presents today to establish care and has concerns for difficulty walking due to joint pain.    Medical conditions include:  Diffuse pain and muscle weakness- Celecoxib 200 mg, flexeril 5mg  Migraines-topiramate 25 mg, Nurtec 75 mg every other day    She continues to have joint pain and difficulty walking.  She has a history of a positive BHUMIKA test and followed up with rheumatology, further workup was not conclusive.  In October she was diagnosed with alpha gal by rheumatologist.   She has changed her diet appropriately and has not had improvement in her symptoms.   Rheumatology recommended to repeat labs, she has not yet scheduled an appointment.     Dr. Melara had performed extensive blood work, which all returned within normal limits and negative for the cause of her pain.  MRI cervical spine- mild herniation, otherwise unremarkable  MRI brain was unremarkable.  Dr. Melara had concerns for fibromyalgia.    States she is no longer just having left-sided weakness, but on both sides.  Some days she is completely fine and has no issues, but other days she has to use forearm crutches and has significant diffuse pain.  Wants lumbar imaging performed because she is having low back pain.  Lower back pain has been going on for a long time, but recently noticed her low back pain increases at the same time as the bilateral leg weakness.   Reports bowel/bladder incontinence and saddle anesthesia when she has the leg weakness and low back pain.   When she is not having the leg weakness, she does not have the bowel/bladder incontinence or saddle anesthesia.   Tried lyrica, she did not have any improvement with this.   Wants to stop celecoxib.  Flexeril gives her some  "relief.     Objective   /78 (BP Location: Left arm)   Pulse 77   Resp 18   Ht 157.5 cm (62.01\")   Wt 77.1 kg (170 lb)   SpO2 98%   BMI 31.09 kg/m²     Estimated body mass index is 31.09 kg/m² as calculated from the following:    Height as of this encounter: 157.5 cm (62.01\").    Weight as of this encounter: 77.1 kg (170 lb).     Physical Exam   GEN: In no acute distress, non toxic appearing  CV: Regular rate and rhythm, no murmurs, 2+ peripheral pulses, No extremity edema.   RESP: Lungs clear to auscultation anteriorly and posteriorly in all lung fields bilaterally.  MSK: No joint erythema, deformity, or effusion. Good ROM in upper and lower extremities.  NEURO: AAO to person, place, and time. CN 2-12 intact grossly.  Strength 3/5 in LUE, 5/5 in RUE.  Strength 3/5 in LLE, 5/5 in RLE.  Strength 5/5 in all 4 extremities. Sensation to light touch reduced in LUE and LLE, intact in RUE and RLE.  PSYCH: Affect normal, insight fair     PHQ-2 Depression Screening  Little interest or pleasure in doing things? Not at all   Feeling down, depressed, or hopeless? Not at all   PHQ-2 Total Score 0         Result Review :              Assessment and Plan     Assessment & Plan  Encounter to establish care         Chronic bilateral low back pain without sciatica    Orders:    XR Spine Lumbar 2 or 3 View; Future  Discussed if she has another episode where she has the low back pain, bilateral leg weakness, urinary/bowel incontinence, and saddle anesthesia, recommend going to the ER for further evaluation as this could be an emergency.   Depending on the lumbar x-ray, MRI may be ordered.  Intractable persistent migraine aura without cerebral infarction and without status migrainosus  Continue to take topiramate and Nurtec.  Discussed she can also take 200 mg co-Q10 and 500 mg magnesium.  Orders:    Ambulatory Referral to Neurology    Nerve pain  Referral to neurology has been made due to her diffuse nerve pain and " weakness.  Encouraged her to follow-up with rheumatology, she states she will do this and obtain the labs they had wanted to repeat.       Arthralgia, unspecified joint  Discussed I would like her to discontinue the Celebrex due to potential adverse side effects, she agrees.  At this time she states she would rather take over-the-counter medication such as Tylenol as needed.       Muscle weakness (generalized)               Follow Up     Return in about 3 months (around 4/9/2025) for Recheck.

## 2025-03-07 ENCOUNTER — E-VISIT (OUTPATIENT)
Dept: ADMINISTRATIVE | Facility: OTHER | Age: 35
End: 2025-03-07
Payer: OTHER GOVERNMENT

## 2025-03-07 NOTE — E-VISIT ESCALATED
Status: Referred Out  Date: 2025 11:26:37  Acuity Level: Not applicable  Referral message: Based on the information you provided during your interview, eVisit is not appropriate for treating your condition.  Patient: Kayleigh Weaver  Patient : 1990  Patient Address: [address] [place] [address] [place] [address]  Patient Phone: [phone]  Clinician Response: Unavailable  Diagnosis: Unavailable  Diagnosis ICD: Unavailable     Patient Interview Questions and Responses: None available

## 2025-03-11 ENCOUNTER — OFFICE VISIT (OUTPATIENT)
Dept: FAMILY MEDICINE CLINIC | Facility: CLINIC | Age: 35
End: 2025-03-11
Payer: OTHER GOVERNMENT

## 2025-03-11 VITALS
WEIGHT: 169 LBS | RESPIRATION RATE: 20 BRPM | HEART RATE: 69 BPM | OXYGEN SATURATION: 99 % | DIASTOLIC BLOOD PRESSURE: 80 MMHG | BODY MASS INDEX: 30.9 KG/M2 | SYSTOLIC BLOOD PRESSURE: 124 MMHG

## 2025-03-11 DIAGNOSIS — H93.11 TINNITUS OF RIGHT EAR: ICD-10-CM

## 2025-03-11 DIAGNOSIS — H91.91 DECREASED HEARING OF RIGHT EAR: Primary | ICD-10-CM

## 2025-03-11 DIAGNOSIS — M26.629 TMJ SYNDROME: ICD-10-CM

## 2025-03-11 PROCEDURE — 99213 OFFICE O/P EST LOW 20 MIN: CPT | Performed by: PHYSICIAN ASSISTANT

## 2025-03-11 NOTE — PROGRESS NOTES
"Chief Complaint  Chief Complaint   Patient presents with    Tinnitus       Subjective        Kayleigh Weaver is a 34 y.o. adult who presents to [facility].  History of Present Illness  Presents today for concerns of ringing and muffled hearing in her right ear since last Thursday.  Began having heightened ringing in her right ear Thursday and it has not gone away.  Also noticed her hearing has decreased in right ear as well, feels as if she has a piece of cotton in her right ear.   Denies vertigo, but feels somewhat off balance.   Denies being recently sick.   Reports TMJ syndrome, clenches and grinds her teeth.   States she has not been getting as much sleep lately which has increased her stress and therefore she has been clenching and grinding her teeth more frequently.    Objective   /80 (BP Location: Left arm)   Pulse 69   Resp 20   Wt 76.7 kg (169 lb)   SpO2 99%   BMI 30.90 kg/m²     Estimated body mass index is 30.9 kg/m² as calculated from the following:    Height as of 1/9/25: 157.5 cm (62.01\").    Weight as of this encounter: 76.7 kg (169 lb).     Physical Exam   GEN: In no acute distress, non toxic appearing  HEENT: Bilateral EACs clear, TMs of normal and healthy appearance, middle ear spaces clear.   PSYCH: Affect normal, insight fair        Result Review :              Assessment and Plan     Assessment & Plan  Decreased hearing of right ear  Discussed as she noticed sudden heightened tinnitus in her right ear and muffled hearing in her right ear and physical exam is unremarkable today, I would like her evaluated by an ENT with a full audiogram to rule out sudden sensorineural hearing loss, she agrees to call and urgent referral has been made.  Discussed this may also be due to TMJ syndrome as she has been recently clenching and grinding her teeth more due to stress and reduced sleep, however if this is a sudden sensorineural hearing loss, treatment is time " sensitive, she expresses understanding.  Orders:    Ambulatory Referral to ENT (Otolaryngology)    Tinnitus of right ear    Orders:    Ambulatory Referral to ENT (Otolaryngology)    TMJ syndrome  Encouraged her to apply heat, massage the area, and gently stretch her neck.  Discussed she may benefit from wearing a bite guard at night.       She is in agreement with this plan and will call with any issues or concerns in the meantime.       Follow Up     Return if symptoms worsen or fail to improve.

## 2025-05-23 ENCOUNTER — LAB (OUTPATIENT)
Dept: FAMILY MEDICINE CLINIC | Facility: CLINIC | Age: 35
End: 2025-05-23
Payer: OTHER GOVERNMENT

## 2025-05-23 ENCOUNTER — OFFICE VISIT (OUTPATIENT)
Dept: FAMILY MEDICINE CLINIC | Facility: CLINIC | Age: 35
End: 2025-05-23
Payer: OTHER GOVERNMENT

## 2025-05-23 VITALS
BODY MASS INDEX: 30.91 KG/M2 | RESPIRATION RATE: 20 BRPM | SYSTOLIC BLOOD PRESSURE: 126 MMHG | HEIGHT: 62 IN | OXYGEN SATURATION: 98 % | DIASTOLIC BLOOD PRESSURE: 80 MMHG | HEART RATE: 80 BPM | WEIGHT: 168 LBS

## 2025-05-23 DIAGNOSIS — Z12.4 ROUTINE PAPANICOLAOU SMEAR: ICD-10-CM

## 2025-05-23 DIAGNOSIS — Z11.59 NEED FOR HEPATITIS C SCREENING TEST: ICD-10-CM

## 2025-05-23 DIAGNOSIS — Z02.89 ENCOUNTER FOR OTHER ADMINISTRATIVE EXAMINATIONS: ICD-10-CM

## 2025-05-23 DIAGNOSIS — Z00.00 ANNUAL PHYSICAL EXAM: Primary | ICD-10-CM

## 2025-05-23 NOTE — PROGRESS NOTES
"Chief complaint: Patient presents today for a physical          Patient is a 34 y.o. adult who presents for her yearly physical exam.     HPI   Medical conditions include:  Diffuse pain and muscle weakness- Celecoxib 200 mg, flexeril 5mg  Migraines-topiramate 25 mg, Nurtec 75 mg every other day    She needs physical forms and titers for school.  Also due for routine pap smear.     - Exercise: physically demanding job   - ETOH:none  - Smoking: none   - Diet: does well with fruit and vegetable intake, not a lot of processed foods, could do better with water intake, less than 200mg caffeine per day, no soft drinks    -Sleep: no concerns  - Contraception: tubal ligation 2014   - LMP: 5/13/2025, irregular   - Depression: no concerns    - Substance Use: none     -Family history of breast cancer in paternal grandmother and uterine and ovarian cancer in maternal grandmother.   -No family history of colon cancer.                         /80 (BP Location: Left arm)   Pulse 80   Resp 20   Ht 157.5 cm (62.01\")   Wt 76.2 kg (168 lb)   SpO2 98%   BMI 30.72 kg/m²       GEN: In no acute distress, non toxic appearing  HEENT: Bilateral EAC's clear, TMs of normal and healthy appearance, middle ear spaces clear. Mucous membranes moist. Oropharynx without erythema or exudate. No cervical or submandibular lymphadenopathy.  CV: Regular rate and rhythm, no murmurs, 2+ peripheral pulses, No extremity edema.   RESP: Lungs clear to auscultation anteriorly and posteriorly in all lung fields bilaterally.  : Chaperone (Irma) present, normal-appearing external genitalia, cervix without abnormal drainage, no bleeding noted.  Pap smear performed.  MSK: No joint erythema, deformity, or effusion. Good ROM in upper and lower extremities.  NEURO: AAO to person, place, and time.   PSYCH: Affect normal, insight fair            Assessment & Plan  Annual physical exam    Orders:    CBC Auto Differential; Future    Comprehensive Metabolic " Panel; Future    Hemoglobin A1c; Future    Lipid Panel; Future    TSH Rfx On Abnormal To Free T4; Future    Urinalysis With Culture If Indicated -; Future    Need for hepatitis C screening test    Orders:    Hepatitis C Antibody; Future    Routine Papanicolaou smear    Orders:    IGP, Aptima HPV, Rfx 16 / 18,45; Future    Encounter for other administrative examinations    Orders:    Measles / Mumps / Rubella Immunity; Future    Varicella zoster antibody, IgG; Future    Hepatitis B Surface Antibody; Future    She will follow-up in 1 year for her annual physical.  She is in agreement with this plan and will call with any issues or concerns in meantime.    HM:  Colorectal Screening:  Start at 45 unless risk factors   Pap: Performed today  Mammogram: Yearly starting at 41 yo to 75 unless risk factors  Bone Density/DEXA: Start if postmenopausal w/ Rfs, otherwise > 65  Hep C: One time screening unless high risk     Screening Labs & Tests:  CBC, CMP, A1C, urinalysis, lipid panel, TFT  DEXA (65+ or postmenopausal with risk factors): N/A  HEP C: Ordered today     Immunization/Vaccinations  Influenza: Recommended annual influenza vaccine  Tetanus/Pertussis: Up to date, received 6/2021, due in 2031  Pneumovax: Not needed at this time  Shingles: Not needed at this time  COVID: Fully vaccinated and boosted     Lifestyle Counseling:  Lifestyle Modifications: Continue good lifestyle choices/modifications, Encouraged diet primarily of whole foods, decrease processed / packaged foods.  Reduce exposure to stress if possible.  Goal 150 minutes of moderate intensity exercise per week.  Decrease screen time.    Safety Issues: Always wear seatbelt, Avoid texting while driving.   Use sunscreen, regular skin examination  Recommended annual dental/vision examination.    Follow up: Return in about 1 year (around 5/23/2026) for Annual physical.  Plan of care discussed with pt. They verbalized understanding and agreement.

## 2025-05-25 ENCOUNTER — HOSPITAL ENCOUNTER (EMERGENCY)
Facility: HOSPITAL | Age: 35
Discharge: HOME | End: 2025-05-25
Attending: EMERGENCY MEDICINE | Admitting: EMERGENCY MEDICINE
Payer: OTHER GOVERNMENT

## 2025-05-25 VITALS
HEIGHT: 62 IN | OXYGEN SATURATION: 99 % | WEIGHT: 160 LBS | HEART RATE: 70 BPM | DIASTOLIC BLOOD PRESSURE: 77 MMHG | SYSTOLIC BLOOD PRESSURE: 128 MMHG | TEMPERATURE: 97.9 F | BODY MASS INDEX: 29.44 KG/M2 | RESPIRATION RATE: 16 BRPM
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
## 2025-05-25 PROCEDURE — 86803 HEPATITIS C AB TEST: CPT | Performed by: EMERGENCY MEDICINE

## 2025-05-25 PROCEDURE — 36415 COLL VENOUS BLD VENIPUNCTURE: CPT

## 2025-05-25 PROCEDURE — 86706 HEP B SURFACE ANTIBODY: CPT | Performed by: EMERGENCY MEDICINE

## 2025-05-25 PROCEDURE — G0432 EIA HIV-1/HIV-2 SCREEN: HCPCS | Performed by: EMERGENCY MEDICINE

## 2025-05-25 PROCEDURE — 99283 EMERGENCY DEPT VISIT LOW MDM: CPT

## 2025-05-25 NOTE — Clinical Note
[facility] [place]  [address]  [place] [address]    Kayleigh Weaver was seen and treated in our emergency department on 5/25/2025.  She may return to work on 05/26/2025.         Thank you for choosing [facility].    Ortega Fulton MD

## 2025-05-26 LAB
HBV SURFACE AB SER RIA-ACNC: REACTIVE
HCV AB SER QL: NORMAL
HIV 1+2 AB+HIV1 P24 AG SERPL QL IA: NORMAL

## 2025-05-26 NOTE — FSED PROVIDER NOTE
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  The odds of this causing anything are extremely low.  I would not treat this myself.  Patient wanted to get the baseline laboratories which we have done for her.  The patient will follow-up with occupational medicine at [facility].      Review of Systems   Skin:  Positive for wound.   All other systems reviewed and are negative.      Past Medical History:   Diagnosis Date    Anxiety     Depression     Headache     Kidney stone     Agar spotted fever        Allergies   Allergen Reactions    Alpha-Gal Shortness Of Breath and Palpitations    Bactrim [Sulfamethoxazole-Trimethoprim] Hives    Adhesive Tape Rash       Past Surgical History:   Procedure Laterality Date     SECTION      CHOLECYSTECTOMY      GALLBLADDER SURGERY      TUBAL ABDOMINAL LIGATION      WISDOM TOOTH EXTRACTION      WISDOM TOOTH EXTRACTION         Family History   Problem Relation Age of Onset    Diabetes Mother     Vision loss Mother     Hyperlipidemia Mother     Rheum arthritis Father     Early death Maternal Aunt         Lupus    Lupus Maternal Aunt     Anemia Maternal Aunt     Heart attack Maternal Grandfather     Heart disease Maternal Grandfather     Heart failure Maternal Grandfather     Hypertension Maternal Grandfather     Asthma Maternal Grandmother     Arrhythmia Sister        Social History     Socioeconomic History    Marital status:    Tobacco Use    Smoking status: Never    Smokeless tobacco: Never   Vaping Use    Vaping status: Never Used   Substance and Sexual Activity    Alcohol use: Not Currently     Alcohol/week: 1.0 standard drink of alcohol     Types: 1 Glasses of wine per week    Drug use: Never    Sexual activity: Yes     Partners: Male     Birth  control/protection: Tubal ligation           Objective   Physical Exam  Vitals and nursing note reviewed.   Constitutional:       General: She is not in acute distress.     Appearance: Normal appearance. She is not ill-appearing, toxic-appearing or diaphoretic.   HENT:      Head: Normocephalic and atraumatic.      Nose: Nose normal.   Pulmonary:      Effort: No respiratory distress.   Skin:     General: Skin is warm and dry.      Findings: Lesion present.      Comments: Left index finger tiny less than the millimeter dot on her finger with a solid needle is not going to transfer any significant amount of blood.  Cleaned immediately it looks normal no therapy is needed   Neurological:      Mental Status: She is alert and oriented to person, place, and time.         Procedures           ED Course                                           Medical Decision Making  Patient may return back to work left index finger has a very tiny breakage of the epidermis does not really appear to be large enough to transfer anything from my solid needle surface that would infect her.  Patient can return to work.    Amount and/or Complexity of Data Reviewed  Labs: ordered.        Final diagnoses:   Needlestick injury accident with exposure to body fluid       ED Disposition  ED Disposition       ED Disposition   Discharge    Condition   Stable    Comment   --               Ingrid Figueroa PA-C  [address]  [address]  [place] [address]  [phone]    In 3 days           Medication List      No changes were made to your prescriptions during this visit.

## 2025-05-26 NOTE — DISCHARGE INSTRUCTIONS
Follow-up in [facility] with results of blood work drawn.  Follow-up with [facility] occupational med [address] do this tomorrow.

## 2025-05-29 LAB
CYTOLOGIST CVX/VAG CYTO: NORMAL
CYTOLOGY CVX/VAG DOC CYTO: NORMAL
CYTOLOGY CVX/VAG DOC THIN PREP: NORMAL
DX ICD CODE: NORMAL
HPV GENOTYPE REFLEX: NORMAL
HPV I/H RISK 4 DNA CVX QL PROBE+SIG AMP: NEGATIVE
OTHER STN SPEC: NORMAL
SERVICE CMNT-IMP: NORMAL
STAT OF ADQ CVX/VAG CYTO-IMP: NORMAL

## 2025-08-06 DIAGNOSIS — R53.1 LEFT-SIDED WEAKNESS: Primary | ICD-10-CM

## 2025-08-08 ENCOUNTER — LAB (OUTPATIENT)
Dept: FAMILY MEDICINE CLINIC | Facility: CLINIC | Age: 35
End: 2025-08-08
Payer: OTHER GOVERNMENT

## 2025-08-08 DIAGNOSIS — Z11.59 NEED FOR HEPATITIS C SCREENING TEST: ICD-10-CM

## 2025-08-08 DIAGNOSIS — Z00.00 ANNUAL PHYSICAL EXAM: ICD-10-CM

## 2025-08-08 DIAGNOSIS — Z02.89 ENCOUNTER FOR OTHER ADMINISTRATIVE EXAMINATIONS: ICD-10-CM

## 2025-08-08 LAB
ALBUMIN SERPL-MCNC: 4.1 G/DL (ref 3.5–5.2)
ALBUMIN/GLOB SERPL: 1.3 G/DL
ALP SERPL-CCNC: 65 U/L (ref 39–117)
ALT SERPL W P-5'-P-CCNC: 17 U/L (ref 1–33)
ANION GAP SERPL CALCULATED.3IONS-SCNC: 8.7 MMOL/L (ref 5–15)
AST SERPL-CCNC: 21 U/L (ref 1–32)
BASOPHILS # BLD AUTO: 0.06 10*3/MM3 (ref 0–0.2)
BASOPHILS NFR BLD AUTO: 0.6 % (ref 0–1.5)
BILIRUB SERPL-MCNC: <0.2 MG/DL (ref 0–1.2)
BILIRUB UR QL STRIP: NEGATIVE
BUN SERPL-MCNC: 8 MG/DL (ref 6–20)
BUN/CREAT SERPL: 12.9 (ref 7–25)
CALCIUM SPEC-SCNC: 9.1 MG/DL (ref 8.6–10.5)
CHLORIDE SERPL-SCNC: 104 MMOL/L (ref 98–107)
CHOLEST SERPL-MCNC: 161 MG/DL (ref 0–200)
CLARITY UR: ABNORMAL
CO2 SERPL-SCNC: 25.3 MMOL/L (ref 22–29)
COLOR UR: YELLOW
CREAT SERPL-MCNC: 0.62 MG/DL (ref 0.57–1)
DEPRECATED RDW RBC AUTO: 47 FL (ref 37–54)
EGFRCR SERPLBLD CKD-EPI 2021: 120.8 ML/MIN/1.73
EOSINOPHIL # BLD AUTO: 0.08 10*3/MM3 (ref 0–0.4)
EOSINOPHIL NFR BLD AUTO: 0.8 % (ref 0.3–6.2)
ERYTHROCYTE [DISTWIDTH] IN BLOOD BY AUTOMATED COUNT: 14.3 % (ref 12.3–15.4)
GLOBULIN UR ELPH-MCNC: 3.2 GM/DL
GLUCOSE SERPL-MCNC: 86 MG/DL (ref 65–99)
GLUCOSE UR STRIP-MCNC: NEGATIVE MG/DL
HBA1C MFR BLD: 5.6 % (ref 4.8–5.6)
HBV SURFACE AB SER RIA-ACNC: REACTIVE
HCT VFR BLD AUTO: 39.4 % (ref 34–46.6)
HCV AB SER QL: NORMAL
HDLC SERPL-MCNC: 42 MG/DL (ref 40–60)
HGB BLD-MCNC: 12.5 G/DL (ref 12–15.9)
HGB UR QL STRIP.AUTO: NEGATIVE
HOLD SPECIMEN: NORMAL
IMM GRANULOCYTES # BLD AUTO: 0.03 10*3/MM3 (ref 0–0.05)
IMM GRANULOCYTES NFR BLD AUTO: 0.3 % (ref 0–0.5)
KETONES UR QL STRIP: ABNORMAL
LDLC SERPL CALC-MCNC: 98 MG/DL (ref 0–100)
LDLC/HDLC SERPL: 2.28 {RATIO}
LEUKOCYTE ESTERASE UR QL STRIP.AUTO: NEGATIVE
LYMPHOCYTES # BLD AUTO: 2.91 10*3/MM3 (ref 0.7–3.1)
LYMPHOCYTES NFR BLD AUTO: 28 % (ref 19.6–45.3)
MCH RBC QN AUTO: 28.9 PG (ref 26.6–33)
MCHC RBC AUTO-ENTMCNC: 31.7 G/DL (ref 31.5–35.7)
MCV RBC AUTO: 91 FL (ref 79–97)
MONOCYTES # BLD AUTO: 0.69 10*3/MM3 (ref 0.1–0.9)
MONOCYTES NFR BLD AUTO: 6.6 % (ref 5–12)
NEUTROPHILS NFR BLD AUTO: 6.61 10*3/MM3 (ref 1.7–7)
NEUTROPHILS NFR BLD AUTO: 63.7 % (ref 42.7–76)
NITRITE UR QL STRIP: NEGATIVE
NRBC BLD AUTO-RTO: 0 /100 WBC (ref 0–0.2)
PH UR STRIP.AUTO: 5.5 [PH] (ref 5–8)
PLATELET # BLD AUTO: 296 10*3/MM3 (ref 140–450)
PMV BLD AUTO: 11.1 FL (ref 6–12)
POTASSIUM SERPL-SCNC: 4.4 MMOL/L (ref 3.5–5.2)
PROT SERPL-MCNC: 7.3 G/DL (ref 6–8.5)
PROT UR QL STRIP: NEGATIVE
RBC # BLD AUTO: 4.33 10*6/MM3 (ref 3.77–5.28)
SODIUM SERPL-SCNC: 138 MMOL/L (ref 136–145)
SP GR UR STRIP: 1.02 (ref 1–1.03)
TRIGL SERPL-MCNC: 116 MG/DL (ref 0–150)
TSH SERPL DL<=0.05 MIU/L-ACNC: 2.01 UIU/ML (ref 0.27–4.2)
UROBILINOGEN UR QL STRIP: ABNORMAL
VLDLC SERPL-MCNC: 21 MG/DL (ref 5–40)
WBC NRBC COR # BLD AUTO: 10.38 10*3/MM3 (ref 3.4–10.8)

## 2025-08-08 PROCEDURE — 80061 LIPID PANEL: CPT | Performed by: PHYSICIAN ASSISTANT

## 2025-08-08 PROCEDURE — 83036 HEMOGLOBIN GLYCOSYLATED A1C: CPT | Performed by: PHYSICIAN ASSISTANT

## 2025-08-08 PROCEDURE — 36415 COLL VENOUS BLD VENIPUNCTURE: CPT

## 2025-08-08 PROCEDURE — 84443 ASSAY THYROID STIM HORMONE: CPT | Performed by: PHYSICIAN ASSISTANT

## 2025-08-08 PROCEDURE — 86787 VARICELLA-ZOSTER ANTIBODY: CPT | Performed by: PHYSICIAN ASSISTANT

## 2025-08-08 PROCEDURE — 80053 COMPREHEN METABOLIC PANEL: CPT | Performed by: PHYSICIAN ASSISTANT

## 2025-08-08 PROCEDURE — 86735 MUMPS ANTIBODY: CPT | Performed by: PHYSICIAN ASSISTANT

## 2025-08-08 PROCEDURE — 85025 COMPLETE CBC W/AUTO DIFF WBC: CPT | Performed by: PHYSICIAN ASSISTANT

## 2025-08-08 PROCEDURE — 86803 HEPATITIS C AB TEST: CPT | Performed by: PHYSICIAN ASSISTANT

## 2025-08-08 PROCEDURE — 86765 RUBEOLA ANTIBODY: CPT | Performed by: PHYSICIAN ASSISTANT

## 2025-08-08 PROCEDURE — 86762 RUBELLA ANTIBODY: CPT | Performed by: PHYSICIAN ASSISTANT

## 2025-08-08 PROCEDURE — 81003 URINALYSIS AUTO W/O SCOPE: CPT | Performed by: PHYSICIAN ASSISTANT

## 2025-08-08 PROCEDURE — 86706 HEP B SURFACE ANTIBODY: CPT | Performed by: PHYSICIAN ASSISTANT

## 2025-08-09 LAB
MEV IGG SER IA-ACNC: >300 AU/ML
MUV IGG SER IA-ACNC: 23.6 AU/ML
RUBV IGG SERPL IA-ACNC: 1.95 INDEX

## 2025-08-11 LAB — VZV IGG SER QL IA: NORMAL

## 2025-08-26 ENCOUNTER — HOSPITAL ENCOUNTER (OUTPATIENT)
Dept: GENERAL RADIOLOGY | Facility: HOSPITAL | Age: 35
Discharge: HOME | End: 2025-08-26
Admitting: PHYSICIAN ASSISTANT
Payer: OTHER GOVERNMENT

## 2025-08-26 DIAGNOSIS — G89.29 CHRONIC BILATERAL LOW BACK PAIN WITHOUT SCIATICA: ICD-10-CM

## 2025-08-26 DIAGNOSIS — M54.50 CHRONIC BILATERAL LOW BACK PAIN WITHOUT SCIATICA: ICD-10-CM

## 2025-08-26 PROCEDURE — 72100 X-RAY EXAM L-S SPINE 2/3 VWS: CPT
